# Patient Record
Sex: FEMALE | Race: BLACK OR AFRICAN AMERICAN | NOT HISPANIC OR LATINO | Employment: FULL TIME | URBAN - METROPOLITAN AREA
[De-identification: names, ages, dates, MRNs, and addresses within clinical notes are randomized per-mention and may not be internally consistent; named-entity substitution may affect disease eponyms.]

---

## 2023-03-16 LAB
THYROTROPIN (MIU/L) IN SER/PLAS BY DETECTION LIMIT <= 0.05 MIU/L: 0.03 MIU/L (ref 0.44–3.98)
THYROXINE (T4) FREE (NG/DL) IN SER/PLAS: 2.33 NG/DL (ref 0.78–1.48)

## 2023-03-21 LAB
CHLAMYDIA TRACH., AMPLIFIED: NEGATIVE
N. GONORRHEA, AMPLIFIED: NEGATIVE
TRICHOMONAS VAGINALIS: NEGATIVE

## 2023-05-02 LAB
APPEARANCE, URINE: CLEAR
BILIRUBIN, URINE: NEGATIVE
BLOOD, URINE: NEGATIVE
COLOR, URINE: YELLOW
GLUCOSE, URINE: NEGATIVE MG/DL
KETONES, URINE: NEGATIVE MG/DL
LEUKOCYTE ESTERASE, URINE: ABNORMAL
NITRITE, URINE: NEGATIVE
PH, URINE: 6 (ref 5–8)
PROTEIN, URINE: NEGATIVE MG/DL
RBC, URINE: 2 /HPF (ref 0–5)
SPECIFIC GRAVITY, URINE: 1.01 (ref 1–1.03)
SQUAMOUS EPITHELIAL CELLS, URINE: 2 /HPF
UROBILINOGEN, URINE: 2 MG/DL (ref 0–1.9)
WBC, URINE: 17 /HPF (ref 0–5)

## 2023-05-03 LAB — URINE CULTURE: ABNORMAL

## 2023-08-17 LAB
ALANINE AMINOTRANSFERASE (SGPT) (U/L) IN SER/PLAS: 12 U/L (ref 7–45)
ALBUMIN (G/DL) IN SER/PLAS: 4.2 G/DL (ref 3.4–5)
ALKALINE PHOSPHATASE (U/L) IN SER/PLAS: 88 U/L (ref 33–110)
ANION GAP IN SER/PLAS: 13 MMOL/L (ref 10–20)
APPEARANCE, URINE: NORMAL
ASPARTATE AMINOTRANSFERASE (SGOT) (U/L) IN SER/PLAS: 12 U/L (ref 9–39)
BASOPHILS (10*3/UL) IN BLOOD BY AUTOMATED COUNT: 0.08 X10E9/L (ref 0–0.1)
BASOPHILS/100 LEUKOCYTES IN BLOOD BY AUTOMATED COUNT: 1 % (ref 0–2)
BILIRUBIN TOTAL (MG/DL) IN SER/PLAS: 0.4 MG/DL (ref 0–1.2)
BILIRUBIN, URINE: NEGATIVE
BLOOD, URINE: NEGATIVE
CALCIUM (MG/DL) IN SER/PLAS: 9.6 MG/DL (ref 8.6–10.6)
CARBON DIOXIDE, TOTAL (MMOL/L) IN SER/PLAS: 27 MMOL/L (ref 21–32)
CHLORIDE (MMOL/L) IN SER/PLAS: 102 MMOL/L (ref 98–107)
CHOLESTEROL (MG/DL) IN SER/PLAS: 280 MG/DL (ref 0–199)
CHOLESTEROL IN HDL (MG/DL) IN SER/PLAS: 51.4 MG/DL
CHOLESTEROL/HDL RATIO: 5.4
COLOR, URINE: YELLOW
CREATININE (MG/DL) IN SER/PLAS: 0.97 MG/DL (ref 0.5–1.05)
EOSINOPHILS (10*3/UL) IN BLOOD BY AUTOMATED COUNT: 0.68 X10E9/L (ref 0–0.7)
EOSINOPHILS/100 LEUKOCYTES IN BLOOD BY AUTOMATED COUNT: 8.8 % (ref 0–6)
ERYTHROCYTE DISTRIBUTION WIDTH (RATIO) BY AUTOMATED COUNT: 16.1 % (ref 11.5–14.5)
ERYTHROCYTE MEAN CORPUSCULAR HEMOGLOBIN CONCENTRATION (G/DL) BY AUTOMATED: 31.1 G/DL (ref 32–36)
ERYTHROCYTE MEAN CORPUSCULAR VOLUME (FL) BY AUTOMATED COUNT: 81 FL (ref 80–100)
ERYTHROCYTES (10*6/UL) IN BLOOD BY AUTOMATED COUNT: 4.94 X10E12/L (ref 4–5.2)
GFR FEMALE: 72 ML/MIN/1.73M2
GLUCOSE (MG/DL) IN SER/PLAS: 132 MG/DL (ref 74–99)
GLUCOSE, URINE: NEGATIVE MG/DL
HEMATOCRIT (%) IN BLOOD BY AUTOMATED COUNT: 40.2 % (ref 36–46)
HEMOGLOBIN (G/DL) IN BLOOD: 12.5 G/DL (ref 12–16)
IMMATURE GRANULOCYTES/100 LEUKOCYTES IN BLOOD BY AUTOMATED COUNT: 0.4 % (ref 0–0.9)
KETONES, URINE: NEGATIVE MG/DL
LDL: 200 MG/DL (ref 0–99)
LEUKOCYTE ESTERASE, URINE: NEGATIVE
LEUKOCYTES (10*3/UL) IN BLOOD BY AUTOMATED COUNT: 7.7 X10E9/L (ref 4.4–11.3)
LYMPHOCYTES (10*3/UL) IN BLOOD BY AUTOMATED COUNT: 2.45 X10E9/L (ref 1.2–4.8)
LYMPHOCYTES/100 LEUKOCYTES IN BLOOD BY AUTOMATED COUNT: 31.7 % (ref 13–44)
MONOCYTES (10*3/UL) IN BLOOD BY AUTOMATED COUNT: 0.37 X10E9/L (ref 0.1–1)
MONOCYTES/100 LEUKOCYTES IN BLOOD BY AUTOMATED COUNT: 4.8 % (ref 2–10)
NEUTROPHILS (10*3/UL) IN BLOOD BY AUTOMATED COUNT: 4.13 X10E9/L (ref 1.2–7.7)
NEUTROPHILS/100 LEUKOCYTES IN BLOOD BY AUTOMATED COUNT: 53.3 % (ref 40–80)
NITRITE, URINE: NEGATIVE
NRBC (PER 100 WBCS) BY AUTOMATED COUNT: 0 /100 WBC (ref 0–0)
PH, URINE: 5 (ref 5–8)
PLATELETS (10*3/UL) IN BLOOD AUTOMATED COUNT: 444 X10E9/L (ref 150–450)
POTASSIUM (MMOL/L) IN SER/PLAS: 4.2 MMOL/L (ref 3.5–5.3)
PROTEIN TOTAL: 7.7 G/DL (ref 6.4–8.2)
PROTEIN, URINE: NEGATIVE MG/DL
SODIUM (MMOL/L) IN SER/PLAS: 138 MMOL/L (ref 136–145)
SPECIFIC GRAVITY, URINE: 1.02 (ref 1–1.03)
THYROTROPIN (MIU/L) IN SER/PLAS BY DETECTION LIMIT <= 0.05 MIU/L: 0.31 MIU/L (ref 0.44–3.98)
THYROXINE (T4) FREE (NG/DL) IN SER/PLAS: 1.71 NG/DL (ref 0.78–1.48)
TRIGLYCERIDE (MG/DL) IN SER/PLAS: 143 MG/DL (ref 0–149)
UREA NITROGEN (MG/DL) IN SER/PLAS: 15 MG/DL (ref 6–23)
UROBILINOGEN, URINE: <2 MG/DL (ref 0–1.9)
VLDL: 29 MG/DL (ref 0–40)

## 2023-09-30 PROBLEM — E66.9 OBESITY, CLASS II, BMI 35-39.9: Status: ACTIVE | Noted: 2023-09-30

## 2023-09-30 PROBLEM — F43.20 ADULT SITUATIONAL STRESS DISORDER: Status: ACTIVE | Noted: 2023-09-30

## 2023-09-30 PROBLEM — T81.89XA DELAYED SURGICAL WOUND HEALING: Status: ACTIVE | Noted: 2023-09-30

## 2023-09-30 PROBLEM — R19.8 FIRMNESS OF ABDOMEN: Status: ACTIVE | Noted: 2023-09-30

## 2023-09-30 PROBLEM — N97.9 FEMALE INFERTILITY: Status: ACTIVE | Noted: 2023-09-30

## 2023-09-30 PROBLEM — R92.8 ABNORMALITY OF RIGHT BREAST ON SCREENING MAMMOGRAM: Status: ACTIVE | Noted: 2023-09-30

## 2023-09-30 PROBLEM — N91.2 AMENORRHEA: Status: ACTIVE | Noted: 2023-09-30

## 2023-09-30 PROBLEM — J06.9 URI WITH COUGH AND CONGESTION: Status: ACTIVE | Noted: 2023-09-30

## 2023-09-30 PROBLEM — R35.0 URINARY FREQUENCY: Status: ACTIVE | Noted: 2023-09-30

## 2023-09-30 PROBLEM — I10 HYPERTENSION: Status: ACTIVE | Noted: 2023-09-30

## 2023-09-30 PROBLEM — M65.311 TRIGGER FINGER OF RIGHT THUMB: Status: ACTIVE | Noted: 2023-09-30

## 2023-09-30 PROBLEM — N83.202 CYST OF OVARY, LEFT: Status: ACTIVE | Noted: 2023-09-30

## 2023-09-30 PROBLEM — N92.6 ABNORMAL MENSTRUATION: Status: ACTIVE | Noted: 2023-09-30

## 2023-09-30 PROBLEM — Z86.018 HISTORY OF UTERINE FIBROID: Status: ACTIVE | Noted: 2023-09-30

## 2023-09-30 PROBLEM — K58.0 IRRITABLE BOWEL SYNDROME WITH DIARRHEA: Status: ACTIVE | Noted: 2023-09-30

## 2023-09-30 PROBLEM — N93.9 ABNORMAL UTERINE BLEEDING (AUB): Status: ACTIVE | Noted: 2023-09-30

## 2023-09-30 PROBLEM — E66.812 OBESITY, CLASS II, BMI 35-39.9: Status: ACTIVE | Noted: 2023-09-30

## 2023-09-30 PROBLEM — Z98.890 STATUS POST BREAST REDUCTION: Status: ACTIVE | Noted: 2023-09-30

## 2023-09-30 PROBLEM — E78.5 HYPERLIPIDEMIA: Status: ACTIVE | Noted: 2023-09-30

## 2023-09-30 PROBLEM — E05.00 GRAVES' OPHTHALMOPATHY: Status: ACTIVE | Noted: 2023-09-30

## 2023-09-30 PROBLEM — D50.9 IRON DEFICIENCY ANEMIA: Status: ACTIVE | Noted: 2023-09-30

## 2023-09-30 PROBLEM — G47.9 SLEEP DISTURBANCE: Status: ACTIVE | Noted: 2023-09-30

## 2023-09-30 PROBLEM — R09.81 SINUS CONGESTION: Status: ACTIVE | Noted: 2023-09-30

## 2023-09-30 PROBLEM — G25.81 RESTLESS LEGS: Status: ACTIVE | Noted: 2023-09-30

## 2023-09-30 PROBLEM — E55.9 VITAMIN D DEFICIENCY: Status: ACTIVE | Noted: 2023-09-30

## 2023-09-30 PROBLEM — R93.5 ABNORMAL ULTRASOUND OF UTERUS: Status: ACTIVE | Noted: 2023-09-30

## 2023-09-30 PROBLEM — R03.0 BLOOD PRESSURE ELEVATED WITHOUT HISTORY OF HTN: Status: ACTIVE | Noted: 2023-09-30

## 2023-09-30 PROBLEM — N92.6 IRREGULAR MENSES: Status: ACTIVE | Noted: 2023-09-30

## 2023-09-30 PROBLEM — R79.9 ABNORMAL BLOOD CHEMISTRY: Status: ACTIVE | Noted: 2023-09-30

## 2023-09-30 PROBLEM — K30 FUNCTIONAL DYSPEPSIA: Status: ACTIVE | Noted: 2023-09-30

## 2023-09-30 PROBLEM — N92.1 BREAKTHROUGH BLEEDING ON BIRTH CONTROL PILLS: Status: ACTIVE | Noted: 2023-09-30

## 2023-09-30 PROBLEM — E03.9 HYPOTHYROIDISM: Status: ACTIVE | Noted: 2023-09-30

## 2023-09-30 PROBLEM — Z90.49 S/P CHOLE: Status: ACTIVE | Noted: 2023-09-30

## 2023-09-30 PROBLEM — R10.9 ABDOMINAL PAIN: Status: ACTIVE | Noted: 2023-09-30

## 2023-09-30 PROBLEM — R39.9 URINARY SYMPTOM OR SIGN: Status: ACTIVE | Noted: 2023-09-30

## 2023-09-30 RX ORDER — BROMPHENIRAMINE MALEATE, PSEUDOEPHEDRINE HYDROCHLORIDE, AND DEXTROMETHORPHAN HYDROBROMIDE 2; 30; 10 MG/5ML; MG/5ML; MG/5ML
10 SYRUP ORAL
COMMUNITY
Start: 2022-07-03 | End: 2024-03-26 | Stop reason: ALTCHOICE

## 2023-09-30 RX ORDER — LEVOCETIRIZINE DIHYDROCHLORIDE 5 MG/1
5 TABLET, FILM COATED ORAL EVERY EVENING
COMMUNITY

## 2023-09-30 RX ORDER — LEVOTHYROXINE SODIUM 13 UG/1
150 CAPSULE ORAL DAILY
COMMUNITY
End: 2024-03-26 | Stop reason: ALTCHOICE

## 2023-09-30 RX ORDER — DESIPRAMINE HYDROCHLORIDE 10 MG/1
1 TABLET ORAL DAILY
COMMUNITY
Start: 2022-02-23 | End: 2024-03-26 | Stop reason: ALTCHOICE

## 2023-09-30 RX ORDER — LEVOTHYROXINE SODIUM 200 UG/1
200 TABLET ORAL DAILY
COMMUNITY
Start: 2020-03-31 | End: 2023-12-21

## 2023-09-30 RX ORDER — LEVOTHYROXINE SODIUM 175 UG/1
1 TABLET ORAL DAILY
COMMUNITY
Start: 2020-03-31 | End: 2024-04-15

## 2023-09-30 RX ORDER — IBUPROFEN 800 MG/1
800 TABLET ORAL EVERY 8 HOURS PRN
COMMUNITY
Start: 2023-03-20

## 2023-09-30 RX ORDER — ACETAMINOPHEN 500 MG
500 TABLET ORAL
COMMUNITY
Start: 2021-01-27 | End: 2024-01-08 | Stop reason: ALTCHOICE

## 2023-09-30 RX ORDER — HYDROCHLOROTHIAZIDE 25 MG/1
1 TABLET ORAL DAILY
COMMUNITY
Start: 2021-12-07 | End: 2023-10-20

## 2023-09-30 RX ORDER — DROSPIRENONE 4 MG/1
1 TABLET, FILM COATED ORAL DAILY
COMMUNITY
Start: 2022-01-07 | End: 2024-03-26 | Stop reason: ALTCHOICE

## 2023-09-30 RX ORDER — DESIPRAMINE HYDROCHLORIDE 25 MG/1
1 TABLET ORAL DAILY
COMMUNITY
Start: 2022-02-23 | End: 2024-03-26 | Stop reason: ALTCHOICE

## 2023-09-30 RX ORDER — FLUTICASONE PROPIONATE 50 MCG
2 SPRAY, SUSPENSION (ML) NASAL DAILY
COMMUNITY

## 2023-09-30 RX ORDER — HYDROXYZINE HYDROCHLORIDE 25 MG/1
1 TABLET, FILM COATED ORAL DAILY
COMMUNITY
Start: 2022-01-14 | End: 2024-06-04 | Stop reason: SDUPTHER

## 2023-09-30 RX ORDER — FERROUS SULFATE 325(65) MG
65 TABLET ORAL
COMMUNITY
Start: 2017-11-29

## 2023-09-30 RX ORDER — ERGOCALCIFEROL 1.25 MG/1
50000 CAPSULE ORAL
COMMUNITY
Start: 2022-03-11

## 2023-09-30 RX ORDER — LEVOTHYROXINE SODIUM 112 UG/1
2 TABLET ORAL DAILY
COMMUNITY
End: 2023-12-21

## 2023-10-02 ENCOUNTER — APPOINTMENT (OUTPATIENT)
Dept: ENDOCRINOLOGY | Facility: CLINIC | Age: 48
End: 2023-10-02
Payer: COMMERCIAL

## 2023-10-03 ENCOUNTER — APPOINTMENT (OUTPATIENT)
Dept: ENDOCRINOLOGY | Facility: CLINIC | Age: 48
End: 2023-10-03
Payer: COMMERCIAL

## 2023-10-05 ENCOUNTER — APPOINTMENT (OUTPATIENT)
Dept: SURGERY | Facility: CLINIC | Age: 48
End: 2023-10-05
Payer: COMMERCIAL

## 2023-10-19 ENCOUNTER — OFFICE VISIT (OUTPATIENT)
Dept: PRIMARY CARE | Facility: CLINIC | Age: 48
End: 2023-10-19
Payer: COMMERCIAL

## 2023-10-19 VITALS — WEIGHT: 174 LBS | SYSTOLIC BLOOD PRESSURE: 124 MMHG | BODY MASS INDEX: 35.14 KG/M2 | DIASTOLIC BLOOD PRESSURE: 84 MMHG

## 2023-10-19 DIAGNOSIS — E78.5 HYPERLIPIDEMIA, UNSPECIFIED HYPERLIPIDEMIA TYPE: Primary | ICD-10-CM

## 2023-10-19 DIAGNOSIS — E78.5 HYPERLIPIDEMIA, UNSPECIFIED HYPERLIPIDEMIA TYPE: ICD-10-CM

## 2023-10-19 PROCEDURE — 99214 OFFICE O/P EST MOD 30 MIN: CPT | Performed by: INTERNAL MEDICINE

## 2023-10-19 PROCEDURE — 3079F DIAST BP 80-89 MM HG: CPT | Performed by: INTERNAL MEDICINE

## 2023-10-19 PROCEDURE — 1036F TOBACCO NON-USER: CPT | Performed by: INTERNAL MEDICINE

## 2023-10-19 PROCEDURE — 3074F SYST BP LT 130 MM HG: CPT | Performed by: INTERNAL MEDICINE

## 2023-10-19 RX ORDER — GLUCOSAMINE/CHONDR SU A SOD 167-133 MG
500 CAPSULE ORAL
Qty: 30 TABLET | Refills: 11 | Status: SHIPPED | OUTPATIENT
Start: 2023-10-19 | End: 2023-11-13

## 2023-10-19 RX ORDER — GLUCOSAMINE/CHONDR SU A SOD 167-133 MG
500 CAPSULE ORAL
Qty: 30 TABLET | Refills: 11 | Status: SHIPPED | OUTPATIENT
Start: 2023-10-19 | End: 2023-10-19

## 2023-10-19 ASSESSMENT — PATIENT HEALTH QUESTIONNAIRE - PHQ9
2. FEELING DOWN, DEPRESSED OR HOPELESS: NOT AT ALL
1. LITTLE INTEREST OR PLEASURE IN DOING THINGS: NOT AT ALL
SUM OF ALL RESPONSES TO PHQ9 QUESTIONS 1 AND 2: 0

## 2023-10-19 NOTE — PROGRESS NOTES
Subjective   Patient ID: Anastacio Mcknight is a 48 y.o. female who presents for No chief complaint on file..    HPI  Patient in for a visit  Stress hard to find a venue for 2024   Review of Systems  General: Denies fever, chills, night sweats,  Eyes: Negative for recent visual changes  Ears, Nose, Throat :  Negative for hearing changes, sinus discomfort  Dermatologic: Negative for new skin conditions, rash  Respiratory: Negative for wheezing, shortness of breath, cough  Cardiovascular: Negative for chest pain, palpitations, or leg swelling  Gastrointestinal: Negative for nausea/vomiting, abdominal pain, changes in bowel habits  Genitourinary NEGATIVE FOR URINARY INCONTINENCE urgency , frequency, discomfort   Musculoskeletal: see hpi  Neurological: Negative for headaches, dizziness    Previous history  Past Medical History:   Diagnosis Date    Adjustment disorder, unspecified 2022    Adult situational stress disorder    Essential (primary) hypertension 2022    Hypertension    Hypertrophy of breast 2019    Large breasts    Hypothyroidism, unspecified 2021    Hypothyroidism    Irregular menstruation, unspecified 2014    Missed period    Other conditions influencing health status 2014    History of pregnancy    Personal history of diseases of the blood and blood-forming organs and certain disorders involving the immune mechanism     History of anemia    Personal history of other infectious and parasitic diseases 2019    History of surgical site infection    Personal history of other specified conditions 2016    History of fatigue    Personal history of other specified conditions 2021    History of dizziness    Sleep disorder, unspecified 2022    Sleep disturbance     Past Surgical History:   Procedure Laterality Date     SECTION, CLASSIC  2017     Section    GALLBLADDER SURGERY  2017    Gallbladder Surgery    OTHER SURGICAL  HISTORY  02/09/2021    Laparoscopy    OTHER SURGICAL HISTORY  02/14/2019    Breast reduction    OTHER SURGICAL HISTORY  08/26/2019    Dilation and curettage    OTHER SURGICAL HISTORY  08/26/2019    [place] tooth extraction    TONSILLECTOMY  11/29/2017    Tonsillectomy     Social History     Tobacco Use    Smoking status: Never    Smokeless tobacco: Never   Substance Use Topics    Alcohol use: Yes     Comment: 3 drinks/month     Family History   Problem Relation Name Age of Onset    Diabetes Father      Cancer Father      Other (cardiac disorder) Brother      Brain cancer Mother's Brother      Breast cancer Maternal Grandmother      Diabetes Maternal Grandfather      Heart failure Other grandmother     Cancer Other grandfather     Cancer Other uncle     Colon cancer Maternal Great-Grandfather       No Known Allergies  Current Outpatient Medications   Medication Instructions    acetaminophen (TYLENOL) 500 mg, oral, EVERY 4 TO 6 HOURS AS NEEDED.<BR>    brompheniramine-pseudoeph-DM 2-30-10 mg/5 mL syrup 10 mL, oral, EVERY 4 TO 6 HOURS AS NEEDED.<BR>    desipramine (Norpramin) 10 mg tablet 1 tablet, oral, Daily    desipramine (Norpramin) 25 mg tablet 1 tablet, oral, Daily    diphenhydramine HCl (BENADRYL ORAL) 1 tablet, oral, Daily PRN    drospirenone, contraceptive, (Slynd) 4 mg (28) tablet 1 tablet, oral, Daily    ergocalciferol (VITAMIN D-2) 50,000 Units, oral, Weekly    ferrous sulfate 325 (65 Fe) MG tablet 65 mg of iron, oral, 3 times daily with meals    fluticasone (Flonase) 50 mcg/actuation nasal spray 2 sprays, Each Nostril, Daily, Shake gently. Before first use, prime pump. After use, clean tip and replace cap.    hydroCHLOROthiazide (HYDRODiuril) 25 mg tablet 1 tablet, oral, Daily    hydrOXYzine HCL (Atarax) 25 mg tablet 1 tablet, oral, Daily    ibuprofen 800 mg, oral, Every 8 hours PRN    levocetirizine (XYZAL) 5 mg, oral, Every evening    levothyroxine (Synthroid, Levoxyl) 112 mcg tablet 2 tablets, oral, Daily     levothyroxine (Synthroid, Levoxyl) 175 mcg tablet 1 tablet, oral, Daily    levothyroxine (SYNTHROID, LEVOXYL) 200 mcg, oral, Daily, monday thru saturday and half pill on sunday    levothyroxine (TIROSINT) 150 mcg, oral, Daily       Objective       Physical Exam    Vital Signs: as recorded above  General: Well groomed, well nourished   Orientation:  Alert , oriented to time, place , and person   Mood and Affect:  Cooperative , no apparent distress normal affect  Skin: Good color, good turgor  Eyes: Extra ocular muscle movements intact, anicteric sclerae  Neck: Supple, full range of movement  Chest: Normal breath sounds, normal chest wall exam, symmetric, good air entry, clear to auscultation  Heart: Regular rate and rhythm, without murmur, gallop, or rubs  BACK:  no CTLS spine tenderness, no flank tenderness  Extremities: full range of movement  bilateral UE and bilateral LE,  no lower extremity edema  Neurological: Alert, oriented, cranial nerves II-XII intact except for visual acuity  Sensation:  Intact   Gait: normal steady    Assessment/Plan   Anastacio Mcknight is a 48 y.o. female who presents for the concerns below:    Problem List Items Addressed This Visit    None  Stress      Htn restart hydrochlorothiazide but at 1/2 tab    Anemia with episodic   Works in main campus [employer]    Discussed with:   Return in :    Portions of this note were generated using digital voice recognition software, and may contain grammatical errors       Jeremy Cabral MD  10/19/23  12:14 PM

## 2023-10-20 DIAGNOSIS — I10 HYPERTENSION, UNSPECIFIED TYPE: ICD-10-CM

## 2023-10-20 RX ORDER — HYDROCHLOROTHIAZIDE 25 MG/1
25 TABLET ORAL DAILY
Qty: 90 TABLET | Refills: 1 | Status: SHIPPED | OUTPATIENT
Start: 2023-10-20 | End: 2024-06-05 | Stop reason: ALTCHOICE

## 2023-10-30 ENCOUNTER — OFFICE VISIT (OUTPATIENT)
Dept: DERMATOLOGY | Facility: CLINIC | Age: 48
End: 2023-10-30
Payer: COMMERCIAL

## 2023-10-30 DIAGNOSIS — L21.9 SEBORRHEIC DERMATITIS: ICD-10-CM

## 2023-10-30 PROCEDURE — 1036F TOBACCO NON-USER: CPT

## 2023-10-30 PROCEDURE — 99202 OFFICE O/P NEW SF 15 MIN: CPT

## 2023-10-30 NOTE — PROGRESS NOTES
Subjective   HPI: Anastacio Mcknight is a 48 y.o. female is a new patient here for evaluation and treatment of a dry rough face. Has been going on for months. Uses baby soap and dove as face wash. Does not wear makeup. No amount of lotion applied makes her skin not dry. Hx atopic dermatitis. Notices some flaking in/in-between eyebrows.    ROS: No other skin or systemic complaints other than what is documented elsewhere in the note.    ALLERGIES: Patient has no known allergies.    SOCIAL:  reports that she has never smoked. She has never used smokeless tobacco. She reports current alcohol use. No history on file for drug use.    Objective   Head - Anterior (Face)  Symmetric erythematous patches overlying greasy scales.        Assessment/Plan   1. Seborrheic dermatitis  Head - Anterior (Face)    Start:  Start SKNV cream BID    Discussed benign nature of seb derm. Patient verbalizes agreement to treatment.     Related Medications  ketoconazole-iodoquinoL-hc 2-1-2.5 % cream  Apply a thin layer to the affect area in the morning and at night         FOLLOW UP: 4 weeks, PRN    The patient was encouraged to contact me with any further questions or concerns.  Azalia Blanco PA-C  10/30/2023

## 2023-11-01 ENCOUNTER — NUTRITION (OUTPATIENT)
Dept: NUTRITION | Facility: CLINIC | Age: 48
End: 2023-11-01
Payer: COMMERCIAL

## 2023-11-01 VITALS — WEIGHT: 175.93 LBS | BODY MASS INDEX: 35.47 KG/M2 | HEIGHT: 59 IN

## 2023-11-01 PROCEDURE — 97802 MEDICAL NUTRITION INDIV IN: CPT | Performed by: DIETITIAN, REGISTERED

## 2023-11-01 NOTE — PROGRESS NOTES
Assessment:  Pt is a 48 y.o. yr old Female with a past medical history of HLD, HTN, type 2 diabetes, hypothyroidism, IBS-D, referred for initial nutrition assessment.  Pt wants to improve her health through lifestyle change.  Diet history reflects low intake of fiber, excessive portions of starch and added sugar.  Discussed general healthy eating guidelines and the my plate model to promote wellness, weight mgmt, and disease prevention.  Reviewed sources of dietary fiber and explained benefits in bowel function, glycemia, CHL, and satiety/weight mgmt.  Explored nutrition facts labels of frequently consumed foods.   Pt receptive, asks appropriate questions, demonstrates understanding.  Suggest re-check Hgb A1c. (6.5% at last assessment in 2019, blood glucose at 132 on recent CMP).           Lab Results   Component Value Date    HGBA1C 6.5 06/12/2019    CHOL 280 (H) 08/17/2023    LDLF 200 (H) 08/17/2023    TRIG 143 08/17/2023    HDL 51.4 08/17/2023        Typical Intake: 2 meals/day  Breakfast: [employer] pancakes, sausage, OJ   Lunch:  potato soup from [employer] or smoothie from [employer]  Snack: yoplait yogurt, candy or cookie on occasion   Dinner: roast or chili with vegetables or lasagna or calzone  Beverages: water, flavored water, pepsi, cocktails every other week (in moderation)    Meal preparation: pt cooks, daughter helps  Dining out/take out/fast food: 2x/week    Allergies: None  Intolerance: None  Appetite: Fluctuates  Intake: >75%  GI Symptoms : diarrhea and abdominal pain Frequency: daily.  Gallbladder removed in 2017.  Swallowing Difficulty: No problems with swallowing  Dentition : own    Vitamins/minerals/supplements: folic, women's multivitamin     Exercise: walks a lot    Diagnosis:   Diagnosis Statement 1:  Diagnosis Status: New  Diagnosis : Altered nutrition related lab values  related to  endocrine dysfunction  as evidenced by Hgb A1c 6.5% in 2019, blood glucose on 132 in 8/2023.    Diagnosis Statement  2:  Diagnosis Status: New  Diagnosis : Obese related to  excessive energy intake and physical inactivity  as evidenced by BMI of 35.5.      Intervention:  MNT for weight mgmt, HLD, and DM2    General healthy eating guidelines- my plate model  Heart healthy nutrition therapy      Educational Materials provided: My Plate Planner, NCM Fiber Content List, AVS      Monitoring and Evaluation:  Will monitor weight trend, intake, labs, and pt progress.    Melody Alcaraz MS, RDN, LD

## 2023-11-12 DIAGNOSIS — E78.5 HYPERLIPIDEMIA, UNSPECIFIED HYPERLIPIDEMIA TYPE: ICD-10-CM

## 2023-11-13 RX ORDER — GLUCOSAMINE/CHONDR SU A SOD 167-133 MG
500 CAPSULE ORAL
Qty: 90 TABLET | Refills: 3 | Status: SHIPPED | OUTPATIENT
Start: 2023-11-13 | End: 2024-06-05 | Stop reason: SINTOL

## 2023-11-15 ENCOUNTER — CONSULT (OUTPATIENT)
Dept: ENDOCRINOLOGY | Facility: CLINIC | Age: 48
End: 2023-11-15
Payer: COMMERCIAL

## 2023-11-15 VITALS
HEART RATE: 90 BPM | DIASTOLIC BLOOD PRESSURE: 70 MMHG | RESPIRATION RATE: 18 BRPM | WEIGHT: 175 LBS | HEIGHT: 59 IN | TEMPERATURE: 97.6 F | SYSTOLIC BLOOD PRESSURE: 100 MMHG | BODY MASS INDEX: 35.28 KG/M2

## 2023-11-15 DIAGNOSIS — Z11.3 SCREENING FOR STDS (SEXUALLY TRANSMITTED DISEASES): ICD-10-CM

## 2023-11-15 DIAGNOSIS — N91.4 SECONDARY OLIGOMENORRHEA: ICD-10-CM

## 2023-11-15 DIAGNOSIS — Z31.41 FERTILITY TESTING: ICD-10-CM

## 2023-11-15 DIAGNOSIS — Z11.59 ENCOUNTER FOR SCREENING FOR OTHER VIRAL DISEASES: ICD-10-CM

## 2023-11-15 DIAGNOSIS — Z31.69 PROCREATIVE MANAGEMENT COUNSELING: ICD-10-CM

## 2023-11-15 DIAGNOSIS — Z13.1 SCREENING FOR DIABETES MELLITUS: ICD-10-CM

## 2023-11-15 DIAGNOSIS — N93.9 ABNORMAL UTERINE BLEEDING (AUB): Primary | ICD-10-CM

## 2023-11-15 DIAGNOSIS — Z01.83 ENCOUNTER FOR RH BLOOD TYPING: ICD-10-CM

## 2023-11-15 DIAGNOSIS — Z31.69 ENCOUNTER FOR PRECONCEPTION CONSULTATION: ICD-10-CM

## 2023-11-15 PROCEDURE — 99215 OFFICE O/P EST HI 40 MIN: CPT | Performed by: OBSTETRICS & GYNECOLOGY

## 2023-11-15 RX ORDER — NORETHINDRONE 5 MG/1
5 TABLET ORAL DAILY
Qty: 30 TABLET | Refills: 11 | Status: SHIPPED | OUTPATIENT
Start: 2023-11-15 | End: 2024-06-05 | Stop reason: SDUPTHER

## 2023-11-15 ASSESSMENT — LIFESTYLE VARIABLES: TOBACCO_USE: NO

## 2023-11-15 ASSESSMENT — PAIN SCALES - GENERAL: PAINLEVEL: 0-NO PAIN

## 2023-11-15 NOTE — PATIENT INSTRUCTIONS
48 y.o.  female with  Secondary  infertility, suspected oligoovulation (possible perimenopause) and the following pertinent medical issues: Age (48), AUB with possible adnenomyosis, HTN, hypothryoidism .  Partner SA: No Assessment    COUNSELING  We discussed causes of infertility including hormonal, egg quality issues, structural problems such as endometriosis, adhesions, or tubal problems, uterine factors such as polyps or fibroids, and sperm issues. Reviewed evaluation of such as well. We discussed various methods for achieving pregnancy in some detail including, ovulation induction, insemination, superovulation and IVF.    We discussed the impact of age on fertility. We discussed that a woman is born with all of the follicles that she will have in her lifetime and that these numbers progressively decrease as the patient reaches menopause. We discussed that women can remain fertile into their late 30’s and even early 40’s, however, chance for success is significantly lower for women who have infertility. We also discussed the higher rates of aneuploidy and miscarriage that occur as women age.    Discussed that at patient's age, the only fertility treatment likely to be effective is donor egg IVF.  We briefly discussed donor egg options.  We reviewed that if patient wants to proceed with egg donor would need-  MFM discussion to review risks  Financial consult to discuss costs  Further evaluation and management of AUB and assessment of uterine cavity    Routine Testing  Fertility Center  STDs Within 1 year   Genetic carrier Waiver/Completed   T&S Within 1 year   AMH Within 1 year   TSH Within 1 year   Rubella/Varicella Within 5 years     BMI Testing  Fertility Center  CBC Within 1 year   CMP Within 1 year   HgbA1c Within 1 year   Mag, Phos, Vit D <18 Within 1 year   MFM > 40  REQ   Wt loss consult > 40 OPT     PLAN  Orders Placed This Encounter   Procedures    Endometrial biopsy    US pelvis transvaginal     Antimullerian Hormone (Amh)    TSH with reflex to Free T4 if abnormal    Prolactin    Hemoglobin A1C    Testosterone,Free and Total    Follicle Stimulating Hormone    Luteinizing Hormone    Estradiol    Progesterone    Type And Screen    Rubella Antibody, Igg    Varicella Zoster Antibody, Igg    Hepatitis B surface antigen    Hepatitis C Antibody    HIV-1 and HIV-2 antibodies    Syphilis Screen with Reflex    C. Trachomatis / N. Gonorrhoeae, Amplified Detection       GENETIC SCREENING PATIENT  NA    PARTNER  Yes Semen Analysis: Not ordered today  Yes Genetic screening: Not ordered today    FOLLOW UP   Consults: MFM consult: indication:Age and HTN  Chart to primary nurse for care coordination and patient check list/education  Enroll in Engaged MD  Take prenatal vitamins, vitamin D 2000 IUs daily  Discussed that PAP and mammogram must be updated if appropriate based on age and clinical history and results received before treatment can begin  Discussed that treatment cannot proceed until checklist items are complete   6 week follow up with MD to review further management options (IVF consult).  Also schedule consult with Herminia Garcia to discuss egg donor options  Additional testing for BMI < 18 or > 40: NA    -Will Rx Aygestin now for management of abnormal uterine bleeding; also schedule TVUS and endometrial biopsy as above    If patient decides to pursue egg donor will also need:  >46 yo testing  Diagnostic hysteroscopy  Partner STDs, Sperm freeze, and genetic screening

## 2023-11-15 NOTE — PROGRESS NOTES
In Person    NEW FERTILITY PATIENT VISIT    Referred by:   Dr. Leon  Accompanied today by: Who is accompanying you to your visit:: Spouse      Anastacio Mcknight is a 48 y.o.  female who presents with Please tell us your reason for this visit today: Infertility  -Also has AUB and hypothyroidism  -Currently on synthroid 175 mcg  -AUB- has been evaluated by Dr. Leon and Rx given for Slynd, not currently taking    Relationship Status:     x 18 years     Length of conception attempts:  2 years    PRIOR EVALUATION / TREATMENT  none  Prior Labs   Latest Reference Range & Units 23 09:42   GLUCOSE 74 - 99 mg/dL 132 (H)   SODIUM 136 - 145 mmol/L 138   POTASSIUM 3.5 - 5.3 mmol/L 4.2   CHLORIDE 98 - 107 mmol/L 102   Bicarbonate 21 - 32 mmol/L 27   Anion Gap 10 - 20 mmol/L 13   Blood Urea Nitrogen 6 - 23 mg/dL 15   Creatinine 0.50 - 1.05 mg/dL 0.97   Calcium 8.6 - 10.6 mg/dL 9.6   Albumin 3.4 - 5.0 g/dL 4.2   Alkaline Phosphatase 33 - 110 U/L 88   ALT 7 - 45 U/L 12   AST 9 - 39 U/L 12   Bilirubin Total 0.0 - 1.2 mg/dL 0.4   HDL CHOLESTEROL mg/dL 51.4   Cholesterol/HDL Ratio  5.4 !   LDL Calculated 0 - 99 mg/dL 200 (H)   VLDL 0 - 40 mg/dL 29   TRIGLYCERIDES 0 - 149 mg/dL 143   Total Protein 6.4 - 8.2 g/dL 7.7   CHOLESTEROL 0 - 199 mg/dL 280 (H)   Thyroxine, Free 0.78 - 1.48 ng/dL 1.71 (H)   Thyroid Stimulating Hormone 0.44 - 3.98 mIU/L 0.31 (L)   (H): Data is abnormally high  !: Data is abnormal  (L): Data is abnormally low      Latest Reference Range & Units 23 09:42   WBC 4.4 - 11.3 x10E9/L 7.7   nRBC 0.0 - 0.0 /100 WBC 0.0   RBC 4.00 - 5.20 x10E12/L 4.94   HEMOGLOBIN 12.0 - 16.0 g/dL 12.5   HEMATOCRIT 36.0 - 46.0 % 40.2   MCV 80 - 100 fL 81   MCHC 32.0 - 36.0 g/dL 31.1 (L)   RED CELL DISTRIBUTION WIDTH 11.5 - 14.5 % 16.1 (H)   Platelets 150 - 450 x10E9/L 444   Neutrophils % 40.0 - 80.0 % 53.3   Immature Granulocytes %, Automated 0.0 - 0.9 % 0.4   Lymphocytes % 13.0 - 44.0 % 31.7   Monocytes % 2.0  - 10.0 % 4.8   Eosinophils % 0.0 - 6.0 % 8.8   Basophils % 0.0 - 2.0 % 1.0   Neutrophils Absolute 1.20 - 7.70 x10E9/L 4.13   Lymphocytes Absolute 1.20 - 4.80 x10E9/L 2.45   Monocytes Absolute 0.10 - 1.00 x10E9/L 0.37   Eosinophils Absolute 0.00 - 0.70 x10E9/L 0.68   Basophils Absolute 0.00 - 0.10 x10E9/L 0.08   (L): Data is abnormally low  (H): Data is abnormally high    TVUS 202  FINDINGS:  UTERUS:  Uterus measures 11.2 cm in length, 7 cm in transverse dimension and  4.1 cm in AP dimension. A 1.8 cm intramural fundal leiomyoma.  Heterogeneous increased echogenicity of inner myometrium/junctional  zone adjoining the endometrial lining concerning for adenomyosis.     ENDOMETRIUM:  Endometrium is of normal thickness measuring 4 mm.     RIGHT ADNEXA:  Right ovary could not be visualized.     LEFT ADNEXA:  A complex cystic solid left adnexal mass lesion is again identified  measuring 5.6 x 3.7 x 5.5 cm. The solid component is echogenic.     CUL DE SAC:  No free fluid.     IMPRESSION:  1.  Minimally enlarged uterus with normal endometrial thickening. A  small intramural fundal leiomyoma and findings in the junctional zone  concerning for adenomyosis.  2. Stable mixed cystic and solid left adnexal mass lesion  corresponding to the mass seen on previous MRI study. Correlating  with MR findings this is compatible with a cystic teratoma.  3. Right ovary could not be visualized.    OB Hx     OB History          9    Para   3    Term   2       1    AB   3    Living   3         SAB   3    IAB        Ectopic        Multiple        Live Births   3               OB Hx-   ETOP x3 as a teenager  SAB x1 as a teenager   x4 (, , , )  - pregnancy was complicated by pre-eclampsa but still delivered at term   - C/Sx1 - for placenta abruption, complicated by PPH and blood transfusion  52015- SAB @ 9 weeks, passes spontaneously and had D&C for retained products  -Last 3 pregnancies were  with current partner    MENSTRUAL HISTORY  LMP:    -Currently bleeding- has been having constant bleeding x 1 month  Menarche:   12  Contraception: What (if any) type of birth control do you currently use?: Slyn  -Has been given Rx for Slynd for abnormal bleeding but only takes it intermittently   Cycle length:    Has been irregular since 2019  -Used to be regular x 4 days, then became irregular (no menses x 1 year) and then had period of continuous bleeding  Describe your bleeding: Describe your bleeding:: Average  Dysmenorrhea: Are your menstrual periods painful?: No    Has had endometrial biopsy -2020  FINAL DIAGNOSIS   A.  ENDOMETRIUM, BIOPSY:    -- WEAKLY PROLIFERATIVE ENDOMETRIUM WITH TUBAL METAPLASIA     Also had EMC done at time of laparoscopy for dermoid cyst-  FINAL DIAGNOSIS   A.  LEFT OVARIAN CYST:    --FRAGMENTS OF MATURE CYSTIC TERATOMA.     B.  ENDOMETRIAL CURETTINGS:   --SUPERFICIAL STRIPS AND FRAGMENTS OF ENDOCERVIX WITH SQUAMOUS METAPLASIA AND   OF LOWER UTERINE SEGMENT ENDOMETRIUM WITH NO SIGNIFICANT PATHOLOGICAL FINDINGS.       ENDOCRINE/INFERTILITY HISTORY  Duration of infertility:    Coital Activity/week:    Nipple Discharge: Do you experience any loss of milk or liquid discharge from the breasts?: No  Vision changes: Are you experiencing any vision changes?: No  Headaches: Are you experiencing headaches?: No  Excess hair growth: Are you experiencing persistent or worsening hair growth on the face, breasts or lower abdomen?: No  Excessive hair loss: Are you experiencing loss of hair from your scalp?: No  Acne: Are you experiencing acne?: No  Oily skin: Oily skin?: No    Recent weight change  Weight gain: Are you experiencing any increase in weight?: No  Weight loss:   Has lost ~ 20 lbs with increased exercise  Exercise more than 3 times a week: Exercise more than 3 times a week?: Unsure      GYN HISTORY   Have you ever been diagnosed with a sexually transmitted disease? Have you ever been  diagnosed with a sexually transmitted disease?: No  Please select all that are applicable:    Have you ever had Pelvic Inflammatory Disease? Have you ever had Pelvic Inflammatory Disease?: No  Have you had an abnormal PAP smear? Have you had an abnormal PAP smear?: No  Date & Result of last PAP smear: 2019 WNL  Have you ever had an abnormal Mammogram? Have you ever had an abnormal mammogram?: Yes  -Due to have additional testing  Date & result of your last mammogram: Date of last mammogram:: 9/2023  Do you have pelvic pain? Do you have pelvic pain?: No  How many times per week do you have intercourse?    Do you have pain with intercourse? Do you have pain with intercourse?: No  Do you use lubricants with intercourse?    Do you have pain with bowel movements? Do you have pain with bowel movements?: No             Do you have pain with a full bladder? Do you have pain with a full bladder?: No      PMH  Past Medical History:   Diagnosis Date    Adjustment disorder, unspecified 11/22/2022    Adult situational stress disorder    Essential (primary) hypertension 11/22/2022    Hypertension    Hypertrophy of breast 01/17/2019    Large breasts    Hypothyroidism, unspecified 12/29/2021    Hypothyroidism    Irregular menstruation, unspecified 06/20/2014    Missed period    Other conditions influencing health status 06/20/2014    History of pregnancy    Personal history of diseases of the blood and blood-forming organs and certain disorders involving the immune mechanism     History of anemia    Personal history of other infectious and parasitic diseases 02/04/2019    History of surgical site infection    Personal history of other specified conditions 05/13/2016    History of fatigue    Personal history of other specified conditions 12/07/2021    History of dizziness    Sleep disorder, unspecified 01/14/2022    Sleep disturbance     -HTN- hydrochlorothiazide  -Hypothyroidism, on 175 mcg synthroid managed by Dr. Alexander        MEDICATIONS  Current Outpatient Medications on File Prior to Visit   Medication Sig Dispense Refill    acetaminophen (Tylenol) 500 mg tablet Take 1 tablet (500 mg) by mouth. EVERY 4 TO 6 HOURS AS NEEDED.      desipramine (Norpramin) 25 mg tablet Take 1 tablet (25 mg) by mouth once daily.      diphenhydramine HCl (BENADRYL ORAL) Take 1 tablet by mouth once daily as needed.      drospirenone, contraceptive, (Slynd) 4 mg (28) tablet Take 1 tablet by mouth once daily.      ergocalciferol (Vitamin D-2) 1.25 MG (64359 UT) capsule Take 1 capsule (50,000 Units) by mouth 1 (one) time per week.      ferrous sulfate 325 (65 Fe) MG tablet Take 1 tablet (65 mg of iron) by mouth 3 times a day with meals.      hydroCHLOROthiazide (HYDRODiuril) 25 mg tablet TAKE 1 TABLET BY MOUTH DAILY 90 tablet 1    hydrOXYzine HCL (Atarax) 25 mg tablet Take 1 tablet (25 mg) by mouth once daily.      ibuprofen 800 mg tablet Take 1 tablet (800 mg) by mouth every 8 hours if needed (pain).      ketoconazole-iodoquinoL-hc 2-1-2.5 % cream Apply a thin layer to the affect area in the morning and at night 30 g 1    levocetirizine (Xyzal) 5 mg tablet Take 1 tablet (5 mg) by mouth once daily in the evening.      levothyroxine (Synthroid, Levoxyl) 175 mcg tablet Take 1 tablet (175 mcg) by mouth once daily.      niacin 500 mg tablet TAKE 1 TABLET (500 MG) BY MOUTH ONCE DAILY WITH A MEAL. 90 tablet 3    brompheniramine-pseudoeph-DM 2-30-10 mg/5 mL syrup Take 10 mL by mouth. EVERY 4 TO 6 HOURS AS NEEDED.      desipramine (Norpramin) 10 mg tablet Take 1 tablet (10 mg) by mouth once daily.      fluticasone (Flonase) 50 mcg/actuation nasal spray Administer 2 sprays into each nostril once daily. Shake gently. Before first use, prime pump. After use, clean tip and replace cap.      levothyroxine (Synthroid, Levoxyl) 112 mcg tablet Take 2 tablets (224 mcg) by mouth once daily.      levothyroxine (Synthroid, Levoxyl) 200 mcg tablet Take 1 tablet (200 mcg) by mouth  once daily. monday thru saturday and half pill on       levothyroxine (Tirosint) 150 mcg capsule Take 1 capsule (150 mcg) by mouth once daily.      [DISCONTINUED] niacin 500 mg tablet TAKE 1 TABLET (500 MG) BY MOUTH ONCE DAILY WITH A MEAL. 30 tablet 11     No current facility-administered medications on file prior to visit.        PSH  Past Surgical History:   Procedure Laterality Date     SECTION, CLASSIC  2017     Section    GALLBLADDER SURGERY  2017    Gallbladder Surgery    OTHER SURGICAL HISTORY  2021    Laparoscopy    OTHER SURGICAL HISTORY  2019    Breast reduction    OTHER SURGICAL HISTORY  2019    Dilation and curettage    OTHER SURGICAL HISTORY  2019    [place] tooth extraction    TONSILLECTOMY  2017    Tonsillectomy      -Laparoscopic ovarian cystectomy with Dr. Dasilva  -Dr. 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 Have you ever been incarcerated?: No  Do you have a history of domestic violence? Do you have a history of domestic violence?: No  Do you feel safe at home? Do you feel safe at home?: Yes  Do you have a history of any negative sexual experience such as incest or rape? Do you have a history of any negative sexual experience such as incest or rape?: Yes       PARTNER HISTORY  Partner Name:  Calvin Ernst  Age 49  : Partner :: 72  Occupation: Partner occupation::   Prior fertility history:   2 children together  PMH:  None  PSH:    None  Smoking:Partner: Recent or current tobacco use: No  Alcohol Use: Partner: Recent or current alcohol use: Yes  Occasional  Drug Use: Partner: Recent or current drug use: No  Medications:    Injuries: Partner: Any history of surgeries or injuries in the reproductive area?: No  STD: Have you ever been diagnosed with a sexually transmitted disease?: No    Please select all that are applicable:    SA: Prior Semen Analysis completed?: No  SA Results:      FAMILY HISTORY  Family History   Problem Relation Name Age of Onset    Diabetes Father      Cancer Father      Other (cardiac disorder) Brother      Brain cancer Mother's Brother      Breast cancer Maternal Grandmother      Diabetes Maternal Grandfather      Heart failure Other grandmother     Cancer Other grandfather     Cancer Other uncle     Colon cancer Maternal Great-Grandfather         CANCER HISTORY    Breast: Breast Cancer: Please answer Yes, No or Unsure. If Yes, please, identify which family member.: Grandma  Ovarian: Ovarian Cancer: Please answer Yes, No or Unsure. If Yes, please, identify which family member.: Cousin  Colon: Colon Cancer: Please answer Yes, No or Unsure. If Yes, please, identify which family member.: Grandma  Endometrial: Endometrial Cancer: Please answer Yes, No or Unsure. If Yes, please, identify which family member.: N/a      FAMILY VTE HISTORY  Family History of Blood Clots: Blood Clots: Please answer Yes, No or Unsure. If Yes, please, identify which family member.: Not sure    GENETIC HISTORY  Ethnic Background  Patient:   //  Partner:     Genetic Disease in Family  Patient: Patient: Defects and genetic syndromes? Please answer Yes, No or Unsure: No  Partner: Partner: Defects and genetic syndromes? Please answer Yes, No or Unsure: No  Birth Defects in Family  Patient: Patient: Birth defects? Please answer Yes, No or Unsure: No  Partner: Partner: Birth defects? Please answer  "Yes, No or Unsure: No  Genetic screening performed previously:   None     BMI:   BMI Readings from Last 1 Encounters:   11/15/23 35.35 kg/m²     VITALS:  /70   Pulse 90   Temp 36.4 °C (97.6 °F)   Resp 18   Ht 1.499 m (4' 11\")   Wt 79.4 kg (175 lb)   LMP  (LMP Unknown)   BMI 35.35 kg/m²     ASSESSMENT   48 y.o.  female with  Secondary  infertility, suspected oligoovulation (possible perimenopause) and the following pertinent medical issues: Age (48), AUB with possible adnenomyosis, HTN, hypothryoidism .  Partner SA: No Assessment    COUNSELING  We discussed causes of infertility including hormonal, egg quality issues, structural problems such as endometriosis, adhesions, or tubal problems, uterine factors such as polyps or fibroids, and sperm issues. Reviewed evaluation of such as well. We discussed various methods for achieving pregnancy in some detail including, ovulation induction, insemination, superovulation and IVF.    We discussed the impact of age on fertility. We discussed that a woman is born with all of the follicles that she will have in her lifetime and that these numbers progressively decrease as the patient reaches menopause. We discussed that women can remain fertile into their late 30’s and even early 40’s, however, chance for success is significantly lower for women who have infertility. We also discussed the higher rates of aneuploidy and miscarriage that occur as women age.    Discussed that at patient's age, the only fertility treatment likely to be effective is donor egg IVF.  We briefly discussed donor egg options.  We reviewed that if patient wants to proceed with egg donor would need-  MFM discussion to review risks  Financial consult to discuss costs  Further evaluation and management of AUB and assessment of uterine cavity    Routine Testing  Fertility Center  STDs Within 1 year   Genetic carrier Waiver/Completed   T&S Within 1 year   AMH Within 1 year   TSH Within 1 " year   Rubella/Varicella Within 5 years     BMI Testing  Fertility Center  CBC Within 1 year   CMP Within 1 year   HgbA1c Within 1 year   Mag, Phos, Vit D <18 Within 1 year   MFM > 40  REQ   Wt loss consult > 40 OPT     PLAN  Orders Placed This Encounter   Procedures    Endometrial biopsy    US pelvis transvaginal    Antimullerian Hormone (Amh)    TSH with reflex to Free T4 if abnormal    Prolactin    Hemoglobin A1C    Testosterone,Free and Total    Follicle Stimulating Hormone    Luteinizing Hormone    Estradiol    Progesterone    Type And Screen    Rubella Antibody, Igg    Varicella Zoster Antibody, Igg    Hepatitis B surface antigen    Hepatitis C Antibody    HIV-1 and HIV-2 antibodies    Syphilis Screen with Reflex    C. Trachomatis / N. Gonorrhoeae, Amplified Detection    Referral to Maternal Fetal Medicine       GENETIC SCREENING PATIENT  NA    PARTNER  Yes Semen Analysis: Not ordered today  Yes Genetic screening: Not ordered today    FOLLOW UP   Consults: MFM consult: indication:Age and HTN  Chart to primary nurse for care coordination and patient check list/education  Enroll in Engaged MD  Take prenatal vitamins, vitamin D 2000 IUs daily  Discussed that PAP and mammogram must be updated if appropriate based on age and clinical history and results received before treatment can begin  Discussed that treatment cannot proceed until checklist items are complete   6 week follow up with MD to review further management options (IVF consult).  Also schedule consult with Herminia Garcia to discuss egg donor options  Additional testing for BMI < 18 or > 40: NA    -Will Rx Aygestin now for management of abnormal uterine bleeding; also schedule TVUS and endometrial biopsy as above    If patient decides to pursue egg donor will also need:  >44 yo testing  Diagnostic hysteroscopy  Partner STDs, Sperm freeze, and genetic screening    Georgina Diallo  11/15/2023  12:45 PM

## 2023-11-20 ENCOUNTER — APPOINTMENT (OUTPATIENT)
Dept: RADIOLOGY | Facility: CLINIC | Age: 48
End: 2023-11-20
Payer: COMMERCIAL

## 2023-11-20 ENCOUNTER — APPOINTMENT (OUTPATIENT)
Dept: OBSTETRICS AND GYNECOLOGY | Facility: CLINIC | Age: 48
End: 2023-11-20
Payer: COMMERCIAL

## 2023-11-22 ENCOUNTER — ANCILLARY PROCEDURE (OUTPATIENT)
Dept: RADIOLOGY | Facility: CLINIC | Age: 48
End: 2023-11-22
Payer: COMMERCIAL

## 2023-11-22 DIAGNOSIS — N91.4 SECONDARY OLIGOMENORRHEA: ICD-10-CM

## 2023-11-22 PROCEDURE — 76856 US EXAM PELVIC COMPLETE: CPT | Performed by: RADIOLOGY

## 2023-11-22 PROCEDURE — 76830 TRANSVAGINAL US NON-OB: CPT

## 2023-11-22 PROCEDURE — 76830 TRANSVAGINAL US NON-OB: CPT | Performed by: RADIOLOGY

## 2023-12-18 ENCOUNTER — TELEMEDICINE (OUTPATIENT)
Dept: ENDOCRINOLOGY | Facility: CLINIC | Age: 48
End: 2023-12-18
Payer: COMMERCIAL

## 2023-12-18 VITALS — WEIGHT: 174 LBS | BODY MASS INDEX: 35.14 KG/M2

## 2023-12-18 DIAGNOSIS — Z31.41 FERTILITY TESTING: Primary | ICD-10-CM

## 2023-12-18 PROCEDURE — 99212 OFFICE O/P EST SF 10 MIN: CPT | Performed by: NURSE PRACTITIONER

## 2023-12-18 ASSESSMENT — ENCOUNTER SYMPTOMS
OCCASIONAL FEELINGS OF UNSTEADINESS: 0
LOSS OF SENSATION IN FEET: 0
DEPRESSION: 0

## 2023-12-18 ASSESSMENT — PATIENT HEALTH QUESTIONNAIRE - PHQ9
SUM OF ALL RESPONSES TO PHQ9 QUESTIONS 1 AND 2: 0
2. FEELING DOWN, DEPRESSED OR HOPELESS: NOT AT ALL
1. LITTLE INTEREST OR PLEASURE IN DOING THINGS: NOT AT ALL

## 2023-12-18 ASSESSMENT — PAIN SCALES - GENERAL: PAINLEVEL: 0-NO PAIN

## 2023-12-18 NOTE — PROGRESS NOTES
Virtual Visit    Follow Up Visit HPI    Patient is a 48 y.o.  female with  secondary infertility, AUB  presenting today for follow up visit.     Dr. Diallo started her on Aygestin and now she is not having any further bleeding.  She has only completed her pelvic ultrasound thus far.     Testing to date:   Result Date Done   Type and Screen: A 2021   Rh: POS 2021   Antibody: No results found for requested labs within last 1825 days.  No results found for requested labs within last 1825 days.   Rubella: No results found for requested labs within last 1825 days. No results found for requested labs within last 1825 days.   Varicella: No results found for requested labs within last 1825 days. No results found for requested labs within last 1825 days.   TSH: 0.31 (L; Ref range: 0.44 - 3.98 mIU/L) 2023   AMH: No results found for requested labs within last 1825 days. No results found for requested labs within last 1825 days.   PRL: No results found for requested labs within last 365 days. No results found for requested labs within last 365 days.   Testosterone: No results found for requested labs within last 365 days. No results found for requested labs within last 365 days.   DHEAS: No results found for requested labs within last 365 days. No results found for requested labs within last 365 days.   FSH: No results found for requested labs within last 365 days. No results found for requested labs within last 365 days.   17 OHP: No results found for requested labs within last 365 days. No results found for requested labs within last 365 days.   Hepatitis B surface antigen: No results found for requested labs within last 365 days. No results found for requested labs within last 365 days.   Hepatitis C antibody: No results found for requested labs within last 365 days. No results found for requested labs within last 365 days.   HIV ½ Antigen Antibody screen with reflex: No results found for  requested labs within last 365 days. No results found for requested labs within last 365 days.   Syphilis screening with reflex: No results found for requested labs within last 365 days. No results found for requested labs within last 365 days.   Other: n/a    Hysterosalpingogram: n/a  Saline Infused Sonography: n/a  GYN Pelvic Ultrasound: US PELVIS TRANSVAGINAL (2023):   IMPRESSION:  Endometrium measures 7 mm in dual layer thickness. If the patient is  experiencing postmenopausal bleeding, this is abnormal and requires  further evaluation to exclude an occult underlying neoplasm. With the  patient is not experiencing postmenopausal bleeding, this is within  normal limits.    Partner SA: No Assessment    Treatment to date: none    Past Medical History:   Diagnosis Date    Adjustment disorder, unspecified 2022    Adult situational stress disorder    Essential (primary) hypertension 2022    Hypertension    Hypertrophy of breast 2019    Large breasts    Hypothyroidism, unspecified 2021    Hypothyroidism    Irregular menstruation, unspecified 2014    Missed period    Other conditions influencing health status 2014    History of pregnancy    Personal history of diseases of the blood and blood-forming organs and certain disorders involving the immune mechanism     History of anemia    Personal history of other infectious and parasitic diseases 2019    History of surgical site infection    Personal history of other specified conditions 2016    History of fatigue    Personal history of other specified conditions 2021    History of dizziness    Sleep disorder, unspecified 2022    Sleep disturbance     Past Surgical History:   Procedure Laterality Date     SECTION, CLASSIC  2017     Section    GALLBLADDER SURGERY  2017    Gallbladder Surgery    OTHER SURGICAL HISTORY  2021    Laparoscopy    OTHER SURGICAL HISTORY  2019     Breast reduction    OTHER SURGICAL HISTORY  08/26/2019    Dilation and curettage    OTHER SURGICAL HISTORY  08/26/2019    [place] tooth extraction    TONSILLECTOMY  11/29/2017    Tonsillectomy     Current Outpatient Medications on File Prior to Visit   Medication Sig Dispense Refill    acetaminophen (Tylenol) 500 mg tablet Take 1 tablet (500 mg) by mouth. EVERY 4 TO 6 HOURS AS NEEDED.      brompheniramine-pseudoeph-DM 2-30-10 mg/5 mL syrup Take 10 mL by mouth. EVERY 4 TO 6 HOURS AS NEEDED.      desipramine (Norpramin) 10 mg tablet Take 1 tablet (10 mg) by mouth once daily.      desipramine (Norpramin) 25 mg tablet Take 1 tablet (25 mg) by mouth once daily.      diphenhydramine HCl (BENADRYL ORAL) Take 1 tablet by mouth once daily as needed.      drospirenone, contraceptive, (Slynd) 4 mg (28) tablet Take 1 tablet by mouth once daily.      ergocalciferol (Vitamin D-2) 1.25 MG (31861 UT) capsule Take 1 capsule (50,000 Units) by mouth 1 (one) time per week.      ferrous sulfate 325 (65 Fe) MG tablet Take 1 tablet (65 mg of iron) by mouth 3 times a day with meals.      fluticasone (Flonase) 50 mcg/actuation nasal spray Administer 2 sprays into each nostril once daily. Shake gently. Before first use, prime pump. After use, clean tip and replace cap.      hydroCHLOROthiazide (HYDRODiuril) 25 mg tablet TAKE 1 TABLET BY MOUTH DAILY 90 tablet 1    hydrOXYzine HCL (Atarax) 25 mg tablet Take 1 tablet (25 mg) by mouth once daily.      ibuprofen 800 mg tablet Take 1 tablet (800 mg) by mouth every 8 hours if needed (pain).      ketoconazole-iodoquinoL-hc 2-1-2.5 % cream Apply a thin layer to the affect area in the morning and at night 30 g 1    levocetirizine (Xyzal) 5 mg tablet Take 1 tablet (5 mg) by mouth once daily in the evening.      levothyroxine (Synthroid, Levoxyl) 112 mcg tablet Take 2 tablets (224 mcg) by mouth once daily.      levothyroxine (Synthroid, Levoxyl) 175 mcg tablet Take 1 tablet (175 mcg) by mouth once daily.       levothyroxine (Synthroid, Levoxyl) 200 mcg tablet Take 1 tablet (200 mcg) by mouth once daily. monday thru saturday and half pill on       levothyroxine (Tirosint) 150 mcg capsule Take 1 capsule (150 mcg) by mouth once daily.      niacin 500 mg tablet TAKE 1 TABLET (500 MG) BY MOUTH ONCE DAILY WITH A MEAL. 90 tablet 3    norethindrone (Aygestin) 5 mg tablet Take 1 tablet (5 mg) by mouth once daily. 30 tablet 11     No current facility-administered medications on file prior to visit.       BMI:   BMI Readings from Last 1 Encounters:   23 35.14 kg/m²     VITALS:  Wt 78.9 kg (174 lb)   LMP  (LMP Unknown)   BMI 35.14 kg/m²   LMP: No LMP recorded (lmp unknown).    ASSESSMENT   48 y.o.  female with secondary infertility x 2 years, and the following pertinent medical issues: AMA, SEUN .    COUNSELING  Reviewed age cut off for IVF.     Routine Testing  Fertility Center  STDs Within 1 year   Genetic carrier Waiver/Completed   T&S Within 1 year   AMH Within 1 year   TSH Within 1 year   Rubella/Varicella Within 5 years     BMI Testing  Fertility Center  CBC Within 1 year   CMP Within 1 year   HgbA1c Within 1 year   Mag, Phos, Vit D <18 Within 1 year   MFM > 40  REQ   Wt loss consult > 40 OPT     PLAN  No orders of the defined types were placed in this encounter.      FOLLOW UP   Consults: MFM consult: indication:already placed, AMLISA Smith MD  Take prenatal vitamins, vitamin D 2000 IUs daily  Discussed that treatment cannot proceed until checklist items are complete.   Additional testing for BMI < 18 or > 40: No.  Needs to make sure pap and mammogram up to date.  Schedule appointment to discuss egg donation with Herminia Garcia CNP.  Also will need IVF consult.  Keep MFM appointment.  Have labs that Dr. Diallo ordered completed.  May need to repeat pelvic ult. Also did not have endo biopsy done. Will folllow up with Dr. Diallo on this.       Cherrie PEARSON Jono  2023  10:57 AM

## 2023-12-19 ENCOUNTER — TELEPHONE (OUTPATIENT)
Dept: ENDOCRINOLOGY | Facility: CLINIC | Age: 48
End: 2023-12-19
Payer: COMMERCIAL

## 2023-12-19 NOTE — TELEPHONE ENCOUNTER
Called patient and left a detailed message to call back to schedule this and to let me know if any questions. I will also send a Stage I Diagnosticst message.      ----- Message from Georgina Diallo MD sent at 12/19/2023  1:37 PM EST -----  Yes she needs EMB  ----- Message -----  From: BAKARI Hidalgo  Sent: 12/18/2023   2:54 PM EST  To: Georgina Diallo MD    You wanted her to have an endo bx right? She only did a pelivc in radiology. Bleeding has now stopped on NET.    BAKARI Hidalgo

## 2023-12-19 NOTE — TELEPHONE ENCOUNTER
Returned patient's call. Patient aware she can call office back tomorrow to schedule biopsy for anytime. No further questions at this time.    ABDULKADIR THAYER on 12/19/23 at 4:09 PM.

## 2023-12-19 NOTE — TELEPHONE ENCOUNTER
Patient got a message from Dr Diallo to get scheduled for an Endo Biopsy   She is calling to schedule that please let her know when she has to schedule it

## 2023-12-21 ENCOUNTER — OFFICE VISIT (OUTPATIENT)
Dept: ENDOCRINOLOGY | Facility: CLINIC | Age: 48
End: 2023-12-21
Payer: COMMERCIAL

## 2023-12-21 VITALS
TEMPERATURE: 98 F | HEART RATE: 94 BPM | DIASTOLIC BLOOD PRESSURE: 88 MMHG | BODY MASS INDEX: 35.95 KG/M2 | SYSTOLIC BLOOD PRESSURE: 115 MMHG | WEIGHT: 178.3 LBS | HEIGHT: 59 IN

## 2023-12-21 DIAGNOSIS — Z01.812 ENCOUNTER FOR PREPROCEDURAL LABORATORY EXAMINATION: Primary | ICD-10-CM

## 2023-12-21 ASSESSMENT — PAIN SCALES - GENERAL: PAINLEVEL: 0-NO PAIN

## 2023-12-22 ENCOUNTER — OFFICE VISIT (OUTPATIENT)
Dept: ENDOCRINOLOGY | Facility: CLINIC | Age: 48
End: 2023-12-22
Payer: COMMERCIAL

## 2023-12-22 VITALS
TEMPERATURE: 97.7 F | HEART RATE: 89 BPM | DIASTOLIC BLOOD PRESSURE: 81 MMHG | SYSTOLIC BLOOD PRESSURE: 114 MMHG | WEIGHT: 177.7 LBS | HEIGHT: 59 IN | BODY MASS INDEX: 35.82 KG/M2

## 2023-12-22 DIAGNOSIS — N93.9 ABNORMAL UTERINE BLEEDING (AUB): Primary | ICD-10-CM

## 2023-12-22 DIAGNOSIS — N95.0 PMB (POSTMENOPAUSAL BLEEDING): ICD-10-CM

## 2023-12-22 PROCEDURE — 3008F BODY MASS INDEX DOCD: CPT

## 2023-12-22 PROCEDURE — 58100 BIOPSY OF UTERUS LINING: CPT

## 2023-12-22 PROCEDURE — 88305 TISSUE EXAM BY PATHOLOGIST: CPT | Mod: TC,SUR

## 2023-12-22 PROCEDURE — 88305 TISSUE EXAM BY PATHOLOGIST: CPT

## 2023-12-22 PROCEDURE — 3074F SYST BP LT 130 MM HG: CPT

## 2023-12-22 PROCEDURE — 3079F DIAST BP 80-89 MM HG: CPT

## 2023-12-22 PROCEDURE — 1036F TOBACCO NON-USER: CPT

## 2023-12-22 PROCEDURE — 88305 TISSUE EXAM BY PATHOLOGIST: CPT | Performed by: PATHOLOGY

## 2023-12-22 ASSESSMENT — PAIN SCALES - GENERAL: PAINLEVEL: 0-NO PAIN

## 2023-12-22 NOTE — PROGRESS NOTES
Procedure: Endometrial Biopsy for AUB/PMB/endometrium 7 mm on ultrasound on 11-    Patient Declined chaperone     Procedure indication: Abnormal uterine bleeding/PMB/thickened endometrium    Risks, benefits and alternatives were discussed with the patient. We discussed possible complications and risks. Written consent was obtained prior to the procedure and is detailed in the patient's record.    Pregnancy Test: Pregnancy test result reviewed- negative    Local anesthesia: No  Tenaculum applied: No  Cervical dilation: No  The endometrial biopsy was completed with an adequate sample obtained .      The patient tolerated the procedure well and had minimal bleeding noted at the conclusion of the procedure.    Sample sent to  Pathology    Plan: will call patient with results and next steps.      12/22/23 at 1:27 PM - MARTIN Saenz-CNP

## 2024-01-02 DIAGNOSIS — M79.644 THUMB PAIN, RIGHT: ICD-10-CM

## 2024-01-04 ENCOUNTER — DOCUMENTATION (OUTPATIENT)
Dept: ENDOCRINOLOGY | Facility: CLINIC | Age: 49
End: 2024-01-04
Payer: COMMERCIAL

## 2024-01-04 LAB
LABORATORY COMMENT REPORT: NORMAL
PATH REPORT.FINAL DX SPEC: NORMAL
PATH REPORT.GROSS SPEC: NORMAL
PATH REPORT.RELEVANT HX SPEC: NORMAL
PATH REPORT.TOTAL CANCER: NORMAL

## 2024-01-04 NOTE — PROGRESS NOTES
Message to patient sent regarding EMB result from 12-: Inactive endometrium consistent with progestin effect, no abnormal cells changes noted. She will follow up with JEANMARIE De La Cruz CNP for next steps.   01/04/24 at 2:39 PM - MARTIN Saenz-JOLYNN

## 2024-01-08 ENCOUNTER — ANCILLARY PROCEDURE (OUTPATIENT)
Dept: RADIOLOGY | Facility: CLINIC | Age: 49
End: 2024-01-08
Payer: COMMERCIAL

## 2024-01-08 ENCOUNTER — LAB (OUTPATIENT)
Dept: LAB | Facility: LAB | Age: 49
End: 2024-01-08
Payer: COMMERCIAL

## 2024-01-08 ENCOUNTER — TELEMEDICINE (OUTPATIENT)
Dept: ENDOCRINOLOGY | Facility: CLINIC | Age: 49
End: 2024-01-08
Payer: COMMERCIAL

## 2024-01-08 VITALS — HEIGHT: 59 IN | BODY MASS INDEX: 35.68 KG/M2 | WEIGHT: 177 LBS

## 2024-01-08 DIAGNOSIS — Z31.69 ENCOUNTER FOR PRECONCEPTION CONSULTATION: ICD-10-CM

## 2024-01-08 DIAGNOSIS — Z13.1 SCREENING FOR DIABETES MELLITUS: ICD-10-CM

## 2024-01-08 DIAGNOSIS — N91.4 SECONDARY OLIGOMENORRHEA: ICD-10-CM

## 2024-01-08 DIAGNOSIS — Z31.41 FERTILITY TESTING: ICD-10-CM

## 2024-01-08 DIAGNOSIS — Z11.59 ENCOUNTER FOR SCREENING FOR OTHER VIRAL DISEASES: ICD-10-CM

## 2024-01-08 DIAGNOSIS — N97.0 FEMALE INFERTILITY DUE TO DIMINISHED OVARIAN RESERVE: ICD-10-CM

## 2024-01-08 DIAGNOSIS — Z11.3 SCREENING FOR STDS (SEXUALLY TRANSMITTED DISEASES): ICD-10-CM

## 2024-01-08 DIAGNOSIS — Z01.83 ENCOUNTER FOR RH BLOOD TYPING: ICD-10-CM

## 2024-01-08 DIAGNOSIS — E78.5 HYPERLIPIDEMIA, UNSPECIFIED HYPERLIPIDEMIA TYPE: ICD-10-CM

## 2024-01-08 DIAGNOSIS — M79.644 THUMB PAIN, RIGHT: ICD-10-CM

## 2024-01-08 DIAGNOSIS — E28.8 FEMALE INFERTILITY DUE TO DIMINISHED OVARIAN RESERVE: ICD-10-CM

## 2024-01-08 DIAGNOSIS — Z31.89 ENCOUNTER FOR FERTILITY PLANNING: Primary | ICD-10-CM

## 2024-01-08 LAB
ABO GROUP (TYPE) IN BLOOD: NORMAL
ALBUMIN SERPL BCP-MCNC: 3.9 G/DL (ref 3.4–5)
ALP SERPL-CCNC: 64 U/L (ref 33–110)
ALT SERPL W P-5'-P-CCNC: 15 U/L (ref 7–45)
ANION GAP SERPL CALC-SCNC: 15 MMOL/L
ANTIBODY SCREEN: NORMAL
AST SERPL W P-5'-P-CCNC: 12 U/L (ref 9–39)
BILIRUB SERPL-MCNC: 0.4 MG/DL (ref 0–1.2)
BUN SERPL-MCNC: 11 MG/DL (ref 6–23)
CALCIUM SERPL-MCNC: 9.3 MG/DL (ref 8.6–10.6)
CHLORIDE SERPL-SCNC: 104 MMOL/L (ref 98–107)
CHOLEST SERPL-MCNC: 244 MG/DL (ref 0–199)
CHOLESTEROL/HDL RATIO: 7.5
CO2 SERPL-SCNC: 25 MMOL/L (ref 21–32)
CREAT SERPL-MCNC: 1.12 MG/DL (ref 0.5–1.05)
EGFRCR SERPLBLD CKD-EPI 2021: 60 ML/MIN/1.73M*2
EST. AVERAGE GLUCOSE BLD GHB EST-MCNC: 186 MG/DL
ESTRADIOL SERPL-MCNC: <19 PG/ML
FSH SERPL-ACNC: 8.3 IU/L
GLUCOSE SERPL-MCNC: 209 MG/DL (ref 74–99)
HBA1C MFR BLD: 8.1 %
HBV SURFACE AG SERPL QL IA: NONREACTIVE
HCV AB SER QL: NONREACTIVE
HDLC SERPL-MCNC: 32.4 MG/DL
HIV 1+2 AB+HIV1 P24 AG SERPL QL IA: NONREACTIVE
LDLC SERPL CALC-MCNC: 179 MG/DL
LH SERPL-ACNC: 1.6 IU/L
NON HDL CHOLESTEROL: 212 MG/DL (ref 0–149)
POTASSIUM SERPL-SCNC: 4.1 MMOL/L (ref 3.5–5.3)
PROGEST SERPL-MCNC: <0.3 NG/ML
PROLACTIN SERPL-MCNC: 4.5 UG/L (ref 3–20)
PROT SERPL-MCNC: 7.4 G/DL (ref 6.4–8.2)
RH FACTOR (ANTIGEN D): NORMAL
RUBV IGG SERPL IA-ACNC: 3.1 IA
RUBV IGG SERPL QL IA: POSITIVE
SODIUM SERPL-SCNC: 140 MMOL/L (ref 136–145)
T PALLIDUM AB SER QL: NONREACTIVE
TRIGL SERPL-MCNC: 163 MG/DL (ref 0–149)
TSH SERPL-ACNC: 0.55 MIU/L (ref 0.44–3.98)
VARICELLA ZOSTER IGG INDEX: 4.2 IA
VLDL: 33 MG/DL (ref 0–40)
VZV IGG SER QL IA: POSITIVE

## 2024-01-08 PROCEDURE — 84443 ASSAY THYROID STIM HORMONE: CPT

## 2024-01-08 PROCEDURE — 80061 LIPID PANEL: CPT

## 2024-01-08 PROCEDURE — 73140 X-RAY EXAM OF FINGER(S): CPT | Mod: RT

## 2024-01-08 PROCEDURE — 83002 ASSAY OF GONADOTROPIN (LH): CPT

## 2024-01-08 PROCEDURE — 86317 IMMUNOASSAY INFECTIOUS AGENT: CPT

## 2024-01-08 PROCEDURE — 86850 RBC ANTIBODY SCREEN: CPT

## 2024-01-08 PROCEDURE — 84144 ASSAY OF PROGESTERONE: CPT

## 2024-01-08 PROCEDURE — 86803 HEPATITIS C AB TEST: CPT

## 2024-01-08 PROCEDURE — 87340 HEPATITIS B SURFACE AG IA: CPT

## 2024-01-08 PROCEDURE — 83001 ASSAY OF GONADOTROPIN (FSH): CPT

## 2024-01-08 PROCEDURE — 82670 ASSAY OF TOTAL ESTRADIOL: CPT

## 2024-01-08 PROCEDURE — 87389 HIV-1 AG W/HIV-1&-2 AB AG IA: CPT

## 2024-01-08 PROCEDURE — 36415 COLL VENOUS BLD VENIPUNCTURE: CPT

## 2024-01-08 PROCEDURE — 73140 X-RAY EXAM OF FINGER(S): CPT | Mod: RIGHT SIDE | Performed by: RADIOLOGY

## 2024-01-08 PROCEDURE — 86901 BLOOD TYPING SEROLOGIC RH(D): CPT

## 2024-01-08 PROCEDURE — 84402 ASSAY OF FREE TESTOSTERONE: CPT

## 2024-01-08 PROCEDURE — 99213 OFFICE O/P EST LOW 20 MIN: CPT | Performed by: NURSE PRACTITIONER

## 2024-01-08 PROCEDURE — 84146 ASSAY OF PROLACTIN: CPT

## 2024-01-08 PROCEDURE — 86780 TREPONEMA PALLIDUM: CPT

## 2024-01-08 PROCEDURE — 80053 COMPREHEN METABOLIC PANEL: CPT

## 2024-01-08 PROCEDURE — 86787 VARICELLA-ZOSTER ANTIBODY: CPT

## 2024-01-08 PROCEDURE — 83516 IMMUNOASSAY NONANTIBODY: CPT

## 2024-01-08 PROCEDURE — 86900 BLOOD TYPING SEROLOGIC ABO: CPT

## 2024-01-08 PROCEDURE — 83036 HEMOGLOBIN GLYCOSYLATED A1C: CPT

## 2024-01-08 PROCEDURE — 87800 DETECT AGNT MULT DNA DIREC: CPT

## 2024-01-08 ASSESSMENT — PAIN SCALES - GENERAL: PAINLEVEL: 0-NO PAIN

## 2024-01-08 NOTE — PROGRESS NOTES
"  Virtual Visit    Follow Up Visit HPI    Patient is a 48 y.o.  female with Diminished Ovarian Reserve and Advanced maternal age and infertility  presenting today for follow up visit to discuss IVF using unidentified oocyte donor.   Patient of Dr. Diallo    Problem focused visit   BMI:   BMI Readings from Last 1 Encounters:   24 35.75 kg/m²     VITALS:  Ht 1.499 m (4' 11\")   Wt 80.3 kg (177 lb)   LMP  (LMP Unknown) Comment: Irregular Menstrual Cycles  BMI 35.75 kg/m²   LMP: No LMP recorded (lmp unknown).    ASSESSMENT   48 y.o.  female with  Secondary  infertility, suspected oligoovulation (possible perimenopause) and the following pertinent medical issues: Age (48), AUB with possible adnenomyosis, HTN, hypothryoidism .  Desires conception with IVF using Donor Oocytes   Partner SA: No Assessment    COUNSELING  Discussed difference between directed donation vs unidentified donation including but not limited to; anonymity, identifiability, financial cost, donor agencies, donor compensation, potential for incomplete donor medical history background information, and legal contract differences.     Discussed unidentified donor oocytes from cryobank vs obtaining oocyte donor from agency to do fresh IVF stimulation including but not limited to; # of oocytes and blastocyst rates with fresh vs frozen oocytes, family planning (how many children the couple wants) financial cost differences, timeline, donor screenings (communicable diseases, FDA screenings, genetics), proven donors vs first time donors, and legal contracts.    Routine Testing  Fertility Center  STDs Within 1 year   Genetic carrier Waiver/Completed   T&S Within 1 year   AMH Within 1 year   TSH Within 1 year   Rubella/Varicella Within 5 years     BMI Testing  Fertility Center  CBC Within 1 year   CMP Within 1 year   HgbA1c Within 1 year   Mag, Phos, Vit D <18 Within 1 year   MFM > 40  REQ   Wt loss consult > 40 OPT     PLAN  Orders " Placed This Encounter   Procedures    Referral to Psychology       FOLLOW UP   Consults: Psych consult: indicationdesires conception with donor oocytes   Engaged MD  Take prenatal vitamins, vitamin D 2000 IUs daily  Discussed that treatment cannot proceed until checklist items are complete.   Patient to schedule follow up visit in 1-2 months once pt has decided if she would like to proceed with Donor Egg Bank or Agency Egg Donor. Advised patient to arrange this now with the .  Chart to primary nurse for care coordination and patient check list/education, message sent to Lisa Luke for care coordination      Herminia Garcia  01/08/2024  1:55 PM

## 2024-01-09 ENCOUNTER — OFFICE VISIT (OUTPATIENT)
Dept: ORTHOPEDIC SURGERY | Facility: CLINIC | Age: 49
End: 2024-01-09
Payer: COMMERCIAL

## 2024-01-09 DIAGNOSIS — M19.049 CMC ARTHRITIS: Primary | ICD-10-CM

## 2024-01-09 LAB
C TRACH RRNA SPEC QL NAA+PROBE: NEGATIVE
N GONORRHOEA DNA SPEC QL PROBE+SIG AMP: NEGATIVE

## 2024-01-09 PROCEDURE — 3008F BODY MASS INDEX DOCD: CPT | Performed by: ORTHOPAEDIC SURGERY

## 2024-01-09 PROCEDURE — 1036F TOBACCO NON-USER: CPT | Performed by: ORTHOPAEDIC SURGERY

## 2024-01-09 PROCEDURE — 99214 OFFICE O/P EST MOD 30 MIN: CPT | Performed by: ORTHOPAEDIC SURGERY

## 2024-01-09 PROCEDURE — 20600 DRAIN/INJ JOINT/BURSA W/O US: CPT | Performed by: ORTHOPAEDIC SURGERY

## 2024-01-09 RX ORDER — TRIAMCINOLONE ACETONIDE 40 MG/ML
20 INJECTION, SUSPENSION INTRA-ARTICULAR; INTRAMUSCULAR
Status: COMPLETED | OUTPATIENT
Start: 2024-01-09 | End: 2024-01-09

## 2024-01-09 RX ORDER — LIDOCAINE HYDROCHLORIDE 10 MG/ML
0.5 INJECTION INFILTRATION; PERINEURAL
Status: COMPLETED | OUTPATIENT
Start: 2024-01-09 | End: 2024-01-09

## 2024-01-09 RX ADMIN — TRIAMCINOLONE ACETONIDE 20 MG: 40 INJECTION, SUSPENSION INTRA-ARTICULAR; INTRAMUSCULAR at 17:24

## 2024-01-09 RX ADMIN — LIDOCAINE HYDROCHLORIDE 0.5 ML: 10 INJECTION INFILTRATION; PERINEURAL at 17:24

## 2024-01-09 ASSESSMENT — PAIN - FUNCTIONAL ASSESSMENT: PAIN_FUNCTIONAL_ASSESSMENT: 0-10

## 2024-01-09 ASSESSMENT — PAIN SCALES - GENERAL: PAINLEVEL_OUTOF10: 4

## 2024-01-09 ASSESSMENT — PAIN DESCRIPTION - DESCRIPTORS: DESCRIPTORS: ACHING

## 2024-01-09 NOTE — PROGRESS NOTES
History of Present Illness:  Chief Complaint   Patient presents with    Right Hand - Pain     Thumb pain       48-year-old female presents for evaluation of right basilar thumb pain.  She was last seen in August 2021 at which time she underwent thumb trigger injection.  The locking and catching resolved following that injection.  She is not having any locking or catching at this time.  Pain is worse with gripping and pinching as well as twisting.  Pain has been worsening over the past month.  No numbness or tingling.    Past Medical History:   Diagnosis Date    Adjustment disorder, unspecified 11/22/2022    Adult situational stress disorder    Essential (primary) hypertension 11/22/2022    Hypertension    Hypertrophy of breast 01/17/2019    Large breasts    Hypothyroidism, unspecified 12/29/2021    Hypothyroidism    Irregular menstruation, unspecified 06/20/2014    Missed period    Other conditions influencing health status 06/20/2014    History of pregnancy    Personal history of diseases of the blood and blood-forming organs and certain disorders involving the immune mechanism     History of anemia    Personal history of other infectious and parasitic diseases 02/04/2019    History of surgical site infection    Personal history of other specified conditions 05/13/2016    History of fatigue    Personal history of other specified conditions 12/07/2021    History of dizziness    Sleep disorder, unspecified 01/14/2022    Sleep disturbance       Medication Documentation Review Audit       Reviewed by Shira Cordero CMA (Medical Assistant) on 01/09/24 at 0823      Medication Order Taking? Sig Documenting Provider Last Dose Status   Discontinued 01/08/24 1122   brompheniramine-pseudoeph-DM 2-30-10 mg/5 mL syrup [id number] No Take 10 mL by mouth. EVERY 4 TO 6 HOURS AS NEEDED. Historical Provider, MD Not Taking Active   desipramine (Norpramin) 10 mg tablet [id number] No Take 1 tablet (10 mg) by mouth once daily. Historical  Provider, MD Taking Active   desipramine (Norpramin) 25 mg tablet [id number] No Take 1 tablet (25 mg) by mouth once daily. Historical Provider, MD Taking Active   diphenhydramine HCl (BENADRYL ORAL) [id number] No Take 1 tablet by mouth once daily as needed. Historical Provider, MD Taking Active   drospirenone, contraceptive, (Slynd) 4 mg (28) tablet [id number] No Take 1 tablet by mouth once daily. Historical Provider, MD Taking Active   ergocalciferol (Vitamin D-2) 1.25 MG (42761 UT) capsule [id number] No Take 1 capsule (50,000 Units) by mouth 1 (one) time per week. Historical Provider, MD Taking Active   ferrous sulfate 325 (65 Fe) MG tablet [id number] No Take 1 tablet by mouth 3 times a day with meals. Historical Provider, MD Taking Active   fluticasone (Flonase) 50 mcg/actuation nasal spray [id number] No Administer 2 sprays into each nostril once daily. Shake gently. Before first use, prime pump. After use, clean tip and replace cap. Historical Provider, MD Taking Active   hydroCHLOROthiazide (HYDRODiuril) 25 mg tablet [id number] No TAKE 1 TABLET BY MOUTH DAILY Jeremy Cabral MD Taking Active   hydrOXYzine HCL (Atarax) 25 mg tablet [id number] No Take 1 tablet (25 mg) by mouth once daily. Historical MD Jose Taking Active   ibuprofen 800 mg tablet [id number] No Take 1 tablet (800 mg) by mouth every 8 hours if needed (pain). Historical Provider, MD Taking Active   ketoconazole-iodoquinoL-hc 2-1-2.5 % cream [id number] No Apply a thin layer to the affect area in the morning and at night Azalia Blanco PA-C Taking Active   levocetirizine (Xyzal) 5 mg tablet [id number] No Take 1 tablet (5 mg) by mouth once daily in the evening. Historical MD Jose Taking Active   levothyroxine (Synthroid, Levoxyl) 175 mcg tablet [id number] No Take 1 tablet (175 mcg) by mouth once daily. Historical MD Jose Taking Active   levothyroxine (Tirosint) 150 mcg capsule [id number] No Take 1 capsule (150 mcg) by mouth once  daily. Sulma Garcia MD Taking Active   niacin 500 mg tablet [id number] No TAKE 1 TABLET (500 MG) BY MOUTH ONCE DAILY WITH A MEAL. Jeremy Cabral MD Taking Active   norethindrone (Aygestin) 5 mg tablet [id number] No Take 1 tablet (5 mg) by mouth once daily. Georgina Diallo MD Taking Active                    No Known Allergies    Social History     Socioeconomic History    Marital status:      Spouse name: Not on file    Number of children: Not on file    Years of education: Not on file    Highest education level: Not on file   Occupational History    Not on file   Tobacco Use    Smoking status: Never    Smokeless tobacco: Never   Substance and Sexual Activity    Alcohol use: Yes     Comment: 3 drinks/month    Drug use: Never    Sexual activity: Yes     Partners: Male   Other Topics Concern    Not on file   Social History Narrative    Not on file     Social Determinants of Health     Financial Resource Strain: Not on file   Food Insecurity: Not on file   Transportation Needs: Not on file   Physical Activity: Not on file   Stress: Not on file   Social Connections: Not on file   Intimate Partner Violence: Not on file   Housing Stability: Not on file       Past Surgical History:   Procedure Laterality Date     SECTION, CLASSIC  2017     Section    GALLBLADDER SURGERY  2017    Gallbladder Surgery    OTHER SURGICAL HISTORY  2021    Laparoscopy    OTHER SURGICAL HISTORY  2019    Breast reduction    OTHER SURGICAL HISTORY  2019    Dilation and curettage    OTHER SURGICAL HISTORY  2019    [place] tooth extraction    TONSILLECTOMY  2017    Tonsillectomy        Review of Systems   GENERAL: Negative for malaise, significant weight loss, fever  MUSCULOSKELETAL: see HPI  NEURO:  Negative     Physical Examination  Constitutional: Appears well-developed and well-nourished.  Head: Normocephalic and atraumatic.  Eyes: EOMI  grossly  Cardiovascular: Intact distal pulses.   Respiratory: Effort normal. No respiratory distress.  Neurologic: Alert and oriented to person, place, and time.  Skin: Skin is warm and dry.  Hematologic / Lymphatic: No lymphedema, lymphangitis.  Psychiatric: normal mood and affect. Behavior is normal.   Musculoskeletal:  Right hand: Skin intact.  Normal rugal patterns.  Tenderness about thumb CMC joint with positive CMC grind.  No tenderness about thumb A1 pulley or first dorsal compartment.  Sensation intact throughout distally.  2+ radial pulse.    Radiographs: Right hand radiographs ordered and available for review demonstrate mild subluxation of CMC joint with mild degenerative changes.  No fracture/dislocation.     Assessment:  Patient with right thumb CMC synovitis     Plan:  Nature of the diagnosis was discussed with the patient.  Treatments of thumb CMC arthritis were discussed with the patient.  This includes nonoperative treatment with symptomatic splinting, anti-inflammatories, corticosteroid injections and possible surgical intervention if conservative measures prove unsuccessful.  Patient has already been using a brace and would like to proceed with corticosteroid injection to see if this helps relieve some of her symptoms.    Patient ID: Anastacio Mcknight is a 48 y.o. female.    S Inj/Asp: R thumb CMC on 1/9/2024 5:24 PM  Indications: pain  Details: 25 G needle (dorsoradial) approach  Medications: 20 mg triamcinolone acetonide 40 mg/mL; 0.5 mL lidocaine 10 mg/mL (1 %)  Outcome: tolerated well, no immediate complications  Procedure, treatment alternatives, risks and benefits explained, specific risks discussed. Consent was given by the patient. Immediately prior to procedure a time out was called to verify the correct patient, procedure, equipment, support staff and site/side marked as required. Patient was prepped and draped in the usual sterile fashion.

## 2024-01-09 NOTE — RESULT ENCOUNTER NOTE
Hi I saw you saw her yesterday for a visit.  Can you follow up with her regarding her HgA1C?  Recommend she endocrinology as this is consistent with diabetes.  Thank you.

## 2024-01-11 LAB — MIS SERPL-MCNC: <0.003 NG/ML

## 2024-01-13 LAB
TESTOSTERONE FREE (CHAN): 1.5 PG/ML (ref 0.1–6.4)
TESTOSTERONE,TOTAL,LC-MS/MS: 7 NG/DL (ref 2–45)

## 2024-01-18 ENCOUNTER — PATIENT MESSAGE (OUTPATIENT)
Dept: PRIMARY CARE | Facility: CLINIC | Age: 49
End: 2024-01-18
Payer: COMMERCIAL

## 2024-01-22 ENCOUNTER — TELEMEDICINE (OUTPATIENT)
Dept: BEHAVIORAL HEALTH | Facility: CLINIC | Age: 49
End: 2024-01-22
Payer: COMMERCIAL

## 2024-01-22 DIAGNOSIS — Z31.69 INFERTILITY COUNSELING: ICD-10-CM

## 2024-01-22 DIAGNOSIS — F43.20 ADULT SITUATIONAL STRESS DISORDER: Primary | ICD-10-CM

## 2024-01-22 PROCEDURE — 90791 PSYCH DIAGNOSTIC EVALUATION: CPT | Performed by: PSYCHOLOGIST
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
## 2024-01-24 ENCOUNTER — INITIAL PRENATAL (OUTPATIENT)
Dept: MATERNAL FETAL MEDICINE | Facility: CLINIC | Age: 49
End: 2024-01-24
Payer: COMMERCIAL

## 2024-01-24 VITALS — SYSTOLIC BLOOD PRESSURE: 126 MMHG | WEIGHT: 176.2 LBS | DIASTOLIC BLOOD PRESSURE: 90 MMHG | BODY MASS INDEX: 35.59 KG/M2

## 2024-01-24 DIAGNOSIS — Z31.69 ENCOUNTER FOR PRECONCEPTION CONSULTATION: Primary | ICD-10-CM

## 2024-01-24 DIAGNOSIS — I10 CHRONIC HYPERTENSION: ICD-10-CM

## 2024-01-24 DIAGNOSIS — E03.8 OTHER SPECIFIED HYPOTHYROIDISM: ICD-10-CM

## 2024-01-24 DIAGNOSIS — O09.529 AMA (ADVANCED MATERNAL AGE) MULTIGRAVIDA 35+, UNSPECIFIED TRIMESTER (HHS-HCC): ICD-10-CM

## 2024-01-24 DIAGNOSIS — E66.9 OBESITY, CLASS II, BMI 35-39.9: ICD-10-CM

## 2024-01-24 DIAGNOSIS — E11.9 TYPE 2 DIABETES MELLITUS WITHOUT COMPLICATION, WITHOUT LONG-TERM CURRENT USE OF INSULIN (MULTI): ICD-10-CM

## 2024-01-24 PROCEDURE — 99214 OFFICE O/P EST MOD 30 MIN: CPT | Performed by: OBSTETRICS & GYNECOLOGY

## 2024-01-24 NOTE — PROGRESS NOTES
Anastacio Mcknight is a 48 y.o.  presenting for an MFM preconception consultation from Dr. Diallo and [facility] due to AMA, chronic HTN and new diagnosis of T2DM. She is without complaints today. ROS is negative.      Issues:   Type 2 Diabetes- hemoglobin A1c recently 8.1%, A1c 4 years ago was borderline. Patient was unaware before today that this is diagnostic for T2DM.   Chronic HTN- stable on hydrochlorothiazide  AMA- would be > 51yo at delivery.   Class II obesity  Hypothyroidism- euthyroid on levothyroxine.   Hx of PEC in a prior pregnancy with abruption requiring transfusion.   Hx of GDM in a prior pregnancy    OBHx: ETOP x 3, SAB x 2,  at term x 4, CD x 1 at 36w due to abruption  GynHx: AUB  MedHx: cHTN, obesity, thyroid disease, T2DM  SurgHx: tonsillectomy, breast reduction, ovarian cystectomy  Meds: synthroid, niacin, hydrochlorothiazide, MVI  All: NKDA  FamHx: noncontributory   SocHx: no toxic habits    O: Visit Vitals  /90   Wt 79.9 kg (176 lb 3.2 oz)   LMP  (LMP Unknown) Comment: Irregular Menstrual Cycles   BMI 35.59 kg/m²   OB Status Having periods   Smoking Status Never   BSA 1.82 m²     Physical exam deferred for this consultative visit.     A/P: 49yo  presenting for a preconception counseling visit. We discussed the following:   Type 2 Diabetes  Reviewed that current A1c is diagnostic for diabetes, A1c >2 years ago was in the prediabetes range. Discussed that goal A1c for pregnancy is ideally < 6.5% but in extenuating circumstances could consider < 7% as a goal.     Reviewed the risks of diabetes in pregnancy namely the increased risk of birth defects and poor pregnancy outcomes that increase with rising A1c. Briefly discussed management of diabetes in pregnancy including insulin as 1st line therapy, serial growth scans and fetal surveillance.     She will follow up with her PCP for management of this new diagnosis.     2. AMA, > 51yo at  delivery    Women of very advanced maternal age (>45 years of age) have higher complication rates, multiple gestation rates, and an increase in rates of  birth and fetal growth restriction.  Details reviewed as followed.  Of note, as almost all the noted risks are increased in the setting of multiple gestation, the benefits of serial single embryo transfers rather than multiple embryo transfers should be reviewed in detail with JON.    The calculated risk of spontaneous loss >=45 years ranges from 66-93%.  In one study of over 148,000 ART pregnancies, the pregnancy loss rate after demonstrated fetal cardiac activity at 7 weeks was still over a third at >42 year.    There are sparse data on the effect of advanced maternal age on single gene disorders and epigenetic events, other than in the setting of assisted reproduction. Epidemiologic studies have reported an association between advanced maternal and paternal age and risk of autism spectrum disorders in offspring.       Several analyses have suggested that the risk of nonchromosomal anomalies also increases as women age. Cardiac anomalies, in particular, as well as clubfoot and CDH seem to increase with maternal age independent of aneuploidy.  Compared with the reference group of women 25 to 29 years of age, offspring of women >=40 years of age were at increased risk of several types of cardiac defects (aOR 2.2-2.9), as well as for esophageal atresia (aOR, 2.9; 95% CI 1.7-4.9), hypospadias (aOR, 2.0; 95% CI 1.4-3.0), and craniosynostosis (aOR, 1.6; 95% CI 1.1-2.4).  The rates of major congenital anomalies for offspring of women >=40 years of age is estimated at 3-5%.    Rates of obstetrical complications are increased in older women.  Women over age >=40 years had an eightfold increased risk of amniotic fluid embolism and threefold increased risk of obstetric shock compared with women age 25 to 29 years. Women in the age 45 to 49 category had a 16-fold  increased risk of renal failure and nearly fivefold increased risk of both obstetric intervention and admission to the intensive care unit. When the groups were stratified by age and compared with reference women age 25 to 29 years, the adjusted overall risk difference was 0.9 percent for women age 40 to 44 years, 1.6 percent for women age 45 to 49 years, and 6.4 percent for women age 50 or greater.     The combination of increased age and hypertension carries particularly high risk of pre-eclampsia and those associated morbidities.   The risk of pre-eclampsia increases to 5 to 10 percent in women over age 40 and is as high as 35 percent in women over age 50.    The incidence of gestational diabetes in the general obstetric population is 3 percent, rising to 7 to 12 percent in women over age 40, and 20 percent in women over age 50.  The prevalence of placental problems, such as abruptio placenta and placenta previa, is higher among older women. Multiparity accounts for significant proportion of the excess risk in both disorders. In fact, there is no significant correlation between maternal age and abruption when parity and hypertension are taken into account.        morbidity & mortality -- Advanced maternal age is responsible for a substantial proportion of the increased rate of low birth weight (LBW) and  delivery (PTD) observed in the past several years.  The excess  mortality experienced by older women is largely due to non-anomalous fetal deaths, which are often unexplained, even after controlling for risk factors such as hypertension, diabetes, antepartum bleeding, smoking, and multiple gestation. Nevertheless, the absolute risk of stillbirth in developed countries is small, even at very advanced maternal ages (1-2% after 28 weeks)    The primary  delivery rate increases to 36 percent for women 45 to 49 years, and 61 percent for women >=50 years. For comparison, the overall  primary  delivery rate for cespedes births in the [place] was approximately 22 percent during a similar time period.  When specific indications for  birth are evaluated, older women appear to have an increased risk of failure of labor to progress normally. The almost linear increase in the relationship between maternal age and uterine dysfunction is a continuous effect throughout the childbearing years.    Older women are at increased risk of maternal death, however, advancing age has only a small effect on the risk of maternal death in developed countries since the risk of dying during childbirth is already very low in these areas. In the [place] from  to , the risk of pregnancy-related maternal mortality for women 35 to 39 years of age was more than twice that of women aged 25 to 29 years (21 versus 9 per 100,000 live births); the risk in women over 40 years old was five-fold higher (46 versus 9 per 100,000 live births).    The experience of pregnancy at an advanced maternal age may impact subsequent health as the woman continues to age, both because of changes from the pregnancy itself and because of increased risk of pregnancy-related complications that negatively affect health. An observational study from the [place] Women's Health Initiative reported a trend toward increased risk of hemorrhagic stroke in women who had their last pregnancy at >=40 years of age compared with women who had their last pregnancy at age <40 years (hazard ratio 1.5, 95% CI 1.0-2.1). Additionally, preeclampsia, a risk factor for subsequent cardiovascular disease, is much more common in women of advancing maternal age.     Baseline labs recommended: PEC labs, A1c, TSH- completed   Baseline testing required: pap smear, mammogram, colonoscopy, EKG.     3. Chronic HTN  Women with chronic hypertension in pregnancy are at an increased risk for both maternal and fetal complications during pregnancy.   While antihypertensive therapy reduces the risk for maternal complications, it does not decrease the risk of fetal complications. For patients with mild to moderate chronic hypertension, anti-hypertensive medication should be started for patients with blood pressures over 140/90.  Blood pressures lower than this have not been associated with adverse maternal or fetal outcomes.  If blood pressure medications are needed, we recommend labetalol, amlodipine or nifedipine XL.  While all blood pressure medications cross the placenta, these are the most efficacious with the best side effect profile.  They are not teratogenic.    Chronic hypertension is a associated with a 10-25% risk of preeclampsia, a 1.5% risk of abruption and up to a 16% risk of intrauterine growth restriction.  If the patient has severe hypertension at the onset of pregnancy, the risk of superimposed preeclampsia is 50%, abruption is up to 10%,  birth up to 70% and intrauterine growth restriction up to 40%.    -Ok to continue hydrochlorothiazide for now but would switch medications during pregnancy.     4. Class II obesity  This was not addressed in detail today but recommended weight gain in pregnancy < 11-20lbs.     5. Thyroid disease  Euthyroid on current dose of synthroid. Will need qtrimester TSH in pregnancy with goal TSH < 2-2.5.     In summary, pregnancy is currently NOT recommended until diabetes management is at goal. At that time, pregnancy is not overtly contraindicated but will be inherently high risk based on multiple comorbidities.     Khushboo Jimenez MD  Maternal Fetal Medicine  Director of Fetal Intervention

## 2024-01-29 ENCOUNTER — OFFICE VISIT (OUTPATIENT)
Dept: PRIMARY CARE | Facility: CLINIC | Age: 49
End: 2024-01-29
Payer: COMMERCIAL

## 2024-01-29 VITALS
WEIGHT: 168 LBS | DIASTOLIC BLOOD PRESSURE: 75 MMHG | SYSTOLIC BLOOD PRESSURE: 105 MMHG | BODY MASS INDEX: 33.93 KG/M2 | HEART RATE: 103 BPM | TEMPERATURE: 99 F | OXYGEN SATURATION: 98 %

## 2024-01-29 DIAGNOSIS — E11.9 TYPE 2 DIABETES MELLITUS WITHOUT COMPLICATION, UNSPECIFIED WHETHER LONG TERM INSULIN USE (MULTI): Primary | ICD-10-CM

## 2024-01-29 DIAGNOSIS — E11.9 TYPE 2 DIABETES MELLITUS WITHOUT COMPLICATION, UNSPECIFIED WHETHER LONG TERM INSULIN USE (MULTI): ICD-10-CM

## 2024-01-29 PROCEDURE — 3078F DIAST BP <80 MM HG: CPT | Performed by: INTERNAL MEDICINE

## 2024-01-29 PROCEDURE — 3050F LDL-C >= 130 MG/DL: CPT | Performed by: INTERNAL MEDICINE

## 2024-01-29 PROCEDURE — 3074F SYST BP LT 130 MM HG: CPT | Performed by: INTERNAL MEDICINE

## 2024-01-29 PROCEDURE — 1036F TOBACCO NON-USER: CPT | Performed by: INTERNAL MEDICINE

## 2024-01-29 PROCEDURE — 3052F HG A1C>EQUAL 8.0%<EQUAL 9.0%: CPT | Performed by: INTERNAL MEDICINE

## 2024-01-29 PROCEDURE — 99214 OFFICE O/P EST MOD 30 MIN: CPT | Performed by: INTERNAL MEDICINE

## 2024-01-29 PROCEDURE — 3008F BODY MASS INDEX DOCD: CPT | Performed by: INTERNAL MEDICINE

## 2024-01-29 ASSESSMENT — COLUMBIA-SUICIDE SEVERITY RATING SCALE - C-SSRS
1. IN THE PAST MONTH, HAVE YOU WISHED YOU WERE DEAD OR WISHED YOU COULD GO TO SLEEP AND NOT WAKE UP?: NO
6. HAVE YOU EVER DONE ANYTHING, STARTED TO DO ANYTHING, OR PREPARED TO DO ANYTHING TO END YOUR LIFE?: NO
2. HAVE YOU ACTUALLY HAD ANY THOUGHTS OF KILLING YOURSELF?: NO
6. HAVE YOU EVER DONE ANYTHING, STARTED TO DO ANYTHING, OR PREPARED TO DO ANYTHING TO END YOUR LIFE?: YES

## 2024-01-29 ASSESSMENT — ENCOUNTER SYMPTOMS
OCCASIONAL FEELINGS OF UNSTEADINESS: 0
LOSS OF SENSATION IN FEET: 0
DEPRESSION: 0

## 2024-01-29 NOTE — PROGRESS NOTES
Subjective   Patient ID: Anastacio Mcknight is a 48 y.o. female who presents for FUV.    HPI  Patient in for a visit  Has not been feeling well   Review of Systems  General: see hpi  Ears, Nose, Throat : see hpi  Dermatologic: Negative for new skin conditions, rash  Respiratory: see hpi  Cardiovascular: Negative for chest pain, palpitations, or leg swelling  Gastrointestinal: Negative for nausea/vomiting, abdominal pain, changes in bowel habits  Neurological: Negative for dizziness see hpi headaches    Previous history  Past Medical History:   Diagnosis Date    Adjustment disorder, unspecified 2022    Adult situational stress disorder    Essential (primary) hypertension 2022    Hypertension    Hypertrophy of breast 2019    Large breasts    Hypothyroidism, unspecified 2021    Hypothyroidism    Irregular menstruation, unspecified 2014    Missed period    Other conditions influencing health status 2014    History of pregnancy    Personal history of diseases of the blood and blood-forming organs and certain disorders involving the immune mechanism     History of anemia    Personal history of other infectious and parasitic diseases 2019    History of surgical site infection    Personal history of other specified conditions 2016    History of fatigue    Personal history of other specified conditions 2021    History of dizziness    Sleep disorder, unspecified 2022    Sleep disturbance     Past Surgical History:   Procedure Laterality Date     SECTION, CLASSIC  2017     Section    GALLBLADDER SURGERY  2017    Gallbladder Surgery    OTHER SURGICAL HISTORY  2021    Laparoscopy    OTHER SURGICAL HISTORY  2019    Breast reduction    OTHER SURGICAL HISTORY  2019    Dilation and curettage    OTHER SURGICAL HISTORY  2019    [place] tooth extraction    TONSILLECTOMY  2017    Tonsillectomy     Social History     Tobacco  Use    Smoking status: Never    Smokeless tobacco: Never   Vaping Use    Vaping Use: Never used   Substance Use Topics    Alcohol use: Not Currently     Comment: 3 drinks/month    Drug use: Never     Family History   Problem Relation Name Age of Onset    Diabetes Father      Cancer Father      Other (cardiac disorder) Brother      Brain cancer Mother's Brother      Breast cancer Maternal Grandmother      Diabetes Maternal Grandfather      Heart failure Other grandmother     Cancer Other grandfather     Cancer Other uncle     Colon cancer Maternal Great-Grandfather       No Known Allergies  Current Outpatient Medications   Medication Instructions    brompheniramine-pseudoeph-DM 2-30-10 mg/5 mL syrup 10 mL, oral, EVERY 4 TO 6 HOURS AS NEEDED.<BR>    desipramine (Norpramin) 10 mg tablet 1 tablet, oral, Daily    desipramine (Norpramin) 25 mg tablet 1 tablet, oral, Daily    diphenhydramine HCl (BENADRYL ORAL) 1 tablet, oral, Daily PRN    drospirenone, contraceptive, (Slynd) 4 mg (28) tablet 1 tablet, oral, Daily    ergocalciferol (VITAMIN D-2) 50,000 Units, oral, Weekly    ferrous sulfate 325 (65 Fe) MG tablet 65 mg of iron, oral, 3 times daily with meals    fluticasone (Flonase) 50 mcg/actuation nasal spray 2 sprays, Each Nostril, Daily, Shake gently. Before first use, prime pump. After use, clean tip and replace cap.    hydroCHLOROthiazide (HYDRODIURIL) 25 mg, oral, Daily    hydrOXYzine HCL (Atarax) 25 mg tablet 1 tablet, oral, Daily    ibuprofen 800 mg, oral, Every 8 hours PRN    ketoconazole-iodoquinoL-hc 2-1-2.5 % cream Apply a thin layer to the affect area in the morning and at night    levocetirizine (XYZAL) 5 mg, oral, Every evening    levothyroxine (Synthroid, Levoxyl) 175 mcg tablet 1 tablet, oral, Daily    levothyroxine (TIROSINT) 150 mcg, oral, Daily    niacin 500 mg, oral, Daily with breakfast    norethindrone (AYGESTIN) 5 mg, oral, Daily       Objective       Physical Exam  Vital Signs: as recorded  above  General: Well groomed, well nourished  with her dtr with consent   Orientation:  Alert , oriented to time, place , and person   Mood and Affect:  Cooperative , no apparent distress normal affect  Skin: Good color, good turgor  Eyes: Extra ocular muscle movements intact, anicteric sclerae  Neck: Supple, full range of movement  Chest: Normal breath sounds, normal chest wall exam, symmetric, good air entry, clear to auscultation  Heart: Regular rate and rhythm, without murmur, gallop, or rubs  BACK:  no CTLS spine tenderness, no flank tenderness  Extremities: full range of movement  bilateral UE and bilateral LE,  no lower extremity edema  Neurological: Alert, oriented, cranial nerves II-XII grossly intact except for visual acuity  Sensation:  Intact   Gait: normal steady      Assessment/Plan   Anastacio Mcknight is a 48 y.o. female who presents for the concerns below:    Problem List Items Addressed This Visit    None    HYPERTENSION PLAN:  , taking blood pressure medication  Follow low salt diet, exercise as discussed, weight control. Continue current medications  Continue to lose weight , will drop her dose to 12.5 mg seh will split her current      HYPERCHOLESTEROLEMIA PLAN: improved , niacin continue current medications  Follow low cholesterol diet, encouraged high omega 3 fatty acid intake in diet, exercise, weight control. . Will Obtain AST/ALT, fasting lipid profile, creatine kinase  on statin if not done within past 3-6 months . Coenzyme Q10 200 mg a day if able to help increase HDL.    DIABETES MELLITUS PLAN:  hba1c 8.1 no medication, her creatinine was elevated BMIis 33.93 so will try ozempic lowest dose   Follow Diabetic diet, will consult nutrition if needed,  exercise as discussed, weight control., before our next visit will obtain HbA1c, BMP, urine microalbumin, ophthalmology exam.  Need Podiatry checks periodically.  No Fam hx of thyroid cancer just hyperthyroidism     RENAL INSUFFICIENCY monitoring  renal function, minimizing medication and supplements that may be aggravating ,, will recheck next visit , bp control needs to be consistent, discontinue otc supplements or systematically lower dosage or discontinue rx that may be causing renal disorder,  hydration very important, if worsening despite these measures  will order u/s kidney and urine protein and consult nephrology, if with electrolyte changes especially high potassium and feeling ill, then emergent evaluation is needed      Discussed with:   Return in : 1 month    Portions of this note were generated using digital voice recognition software, and may contain grammatical errors       Jeremy Cabral MD  01/29/24  3:39 PM

## 2024-01-30 RX ORDER — SEMAGLUTIDE 0.68 MG/ML
0.25 INJECTION, SOLUTION SUBCUTANEOUS
Qty: 3 ML | Refills: 2 | Status: SHIPPED | OUTPATIENT
Start: 2024-01-30 | End: 2024-03-26 | Stop reason: ALTCHOICE

## 2024-02-02 ENCOUNTER — TELEMEDICINE (OUTPATIENT)
Dept: BEHAVIORAL HEALTH | Facility: CLINIC | Age: 49
End: 2024-02-02
Payer: COMMERCIAL

## 2024-02-02 ENCOUNTER — TELEPHONE (OUTPATIENT)
Dept: PRIMARY CARE | Facility: CLINIC | Age: 49
End: 2024-02-02

## 2024-02-02 DIAGNOSIS — F41.1 GAD (GENERALIZED ANXIETY DISORDER): ICD-10-CM

## 2024-02-02 PROCEDURE — 90791 PSYCH DIAGNOSTIC EVALUATION: CPT | Performed by: COUNSELOR

## 2024-02-02 NOTE — TELEPHONE ENCOUNTER
Called Pt and relayed info.  She will contact insurance on Monday.  LG    ----- Message from Jeremy Cabral MD sent at 2/2/2024  3:38 PM EST -----  Regarding: FW: Tooth pulled  Contact: [phone]  I believe I did this yesterday around noon. Ty aq  ----- Message -----  From: Gissell Bell  Sent: 2/2/2024   2:20 PM EST  To: Jeremy Cabral MD  Subject: FW: Tooth pulled                                   ----- Message -----  From: Anastacio Mcknight  Sent: 2/2/2024   2:13 PM EST  To: [facility] [place] [id number] Clinical Support Staff  Subject: Tooth pulled                                     Let me know if you are able to fill out the Exception form please. then I will be able to get the Ozempic     Thank you

## 2024-02-02 NOTE — PROGRESS NOTES
Total Time: 55 min  Diagnosis: anxiety   Visit Type: epic  Reason for visit: NEW    Notes she is struggling with her new job; she would return, but there would be terms, like a work from home day. Notes she is not fully understanding her new role and is not getting support or training; they want to turn the whole dept over to her, but she does not feel comfortable, the program is all over the map, its [facility] and [facility], she still does not fully what to be doing, where things go, etc and met with her boss about it not going well  We discussed the role, is this the role she wants if she got the knowledge she stated yes, and she is vocal and has articulated what she needs but still feels like its not going anywhere  Notes she reached out to her boss's boss and still got a terrible response; began applying other places externally at this point  Notes her daughter is getting  soon and that's exciting but also makes her sad  Notes her youngest child is in an IOP and her other son turned 18 which is also making her sad  Notes she was recently diagnosed with type 2 diabetes and that was upsetting, but she did change her diet and already lost 15 pounds; notes she also was thinking about a baby but would need to do IVF but needs to be healthier  Idea of taking care of herself before she can take care of someone else; all the stress isn't helping her diet or ability to work out and engage in self care  Talked some about her children and where they are at in life; talked some about her childhood and while 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

## 2024-02-29 ENCOUNTER — APPOINTMENT (OUTPATIENT)
Dept: PRIMARY CARE | Facility: CLINIC | Age: 49
End: 2024-02-29
Payer: COMMERCIAL

## 2024-03-01 ENCOUNTER — TELEMEDICINE (OUTPATIENT)
Dept: BEHAVIORAL HEALTH | Facility: CLINIC | Age: 49
End: 2024-03-01
Payer: COMMERCIAL

## 2024-03-01 DIAGNOSIS — F41.1 GAD (GENERALIZED ANXIETY DISORDER): ICD-10-CM

## 2024-03-01 PROCEDURE — 90834 PSYTX W PT 45 MINUTES: CPT | Performed by: COUNSELOR

## 2024-03-01 PROCEDURE — 3008F BODY MASS INDEX DOCD: CPT | Performed by: COUNSELOR

## 2024-03-01 PROCEDURE — 1036F TOBACCO NON-USER: CPT | Performed by: COUNSELOR

## 2024-03-01 NOTE — PROGRESS NOTES
Total Time: 45 min  Diagnosis: anxiety   Visit Type: epic  Reason for visit: managing her worry    - recently had dental work which can cause stress  - notes her daughter is having a baby, which she is really excited about, but she will take guardianship, but that means she has to make her current job work so she can continue to be from home  - did reach out to her management about what she needs and is struggling with; entered a mentoring program to grow in her current position  - talked some about her HX with her mom and how that has impacted her life and decisions and choices with her kids   - nightmares (back, since she was 13, come and go)    focused on:  - coping skills  - emotional identification, regulation and expression   - decision making while managing the things that worry her

## 2024-03-04 ENCOUNTER — OFFICE VISIT (OUTPATIENT)
Dept: PRIMARY CARE | Facility: CLINIC | Age: 49
End: 2024-03-04
Payer: COMMERCIAL

## 2024-03-04 VITALS — DIASTOLIC BLOOD PRESSURE: 85 MMHG | SYSTOLIC BLOOD PRESSURE: 122 MMHG | WEIGHT: 172 LBS | BODY MASS INDEX: 34.74 KG/M2

## 2024-03-04 DIAGNOSIS — E11.69 TYPE 2 DIABETES MELLITUS WITH OTHER SPECIFIED COMPLICATION, UNSPECIFIED WHETHER LONG TERM INSULIN USE (MULTI): ICD-10-CM

## 2024-03-04 DIAGNOSIS — K04.7 DENTAL INFECTION: Primary | ICD-10-CM

## 2024-03-04 PROCEDURE — 3008F BODY MASS INDEX DOCD: CPT | Performed by: INTERNAL MEDICINE

## 2024-03-04 PROCEDURE — 3074F SYST BP LT 130 MM HG: CPT | Performed by: INTERNAL MEDICINE

## 2024-03-04 PROCEDURE — 3052F HG A1C>EQUAL 8.0%<EQUAL 9.0%: CPT | Performed by: INTERNAL MEDICINE

## 2024-03-04 PROCEDURE — 1036F TOBACCO NON-USER: CPT | Performed by: INTERNAL MEDICINE

## 2024-03-04 PROCEDURE — 3050F LDL-C >= 130 MG/DL: CPT | Performed by: INTERNAL MEDICINE

## 2024-03-04 PROCEDURE — 99214 OFFICE O/P EST MOD 30 MIN: CPT | Performed by: INTERNAL MEDICINE

## 2024-03-04 PROCEDURE — 3079F DIAST BP 80-89 MM HG: CPT | Performed by: INTERNAL MEDICINE

## 2024-03-04 RX ORDER — DULAGLUTIDE 0.75 MG/.5ML
0.75 INJECTION, SOLUTION SUBCUTANEOUS
Qty: 2 ML | Refills: 11 | Status: SHIPPED | OUTPATIENT
Start: 2024-03-04 | End: 2024-03-26 | Stop reason: ALTCHOICE

## 2024-03-04 RX ORDER — AMOXICILLIN 875 MG/1
875 TABLET, FILM COATED ORAL 2 TIMES DAILY
Qty: 20 TABLET | Refills: 0 | Status: SHIPPED | OUTPATIENT
Start: 2024-03-04 | End: 2024-03-14

## 2024-03-04 ASSESSMENT — PATIENT HEALTH QUESTIONNAIRE - PHQ9
1. LITTLE INTEREST OR PLEASURE IN DOING THINGS: NOT AT ALL
2. FEELING DOWN, DEPRESSED OR HOPELESS: NOT AT ALL
SUM OF ALL RESPONSES TO PHQ9 QUESTIONS 1 AND 2: 0

## 2024-03-04 ASSESSMENT — COLUMBIA-SUICIDE SEVERITY RATING SCALE - C-SSRS
2. HAVE YOU ACTUALLY HAD ANY THOUGHTS OF KILLING YOURSELF?: NO
1. IN THE PAST MONTH, HAVE YOU WISHED YOU WERE DEAD OR WISHED YOU COULD GO TO SLEEP AND NOT WAKE UP?: NO
6. HAVE YOU EVER DONE ANYTHING, STARTED TO DO ANYTHING, OR PREPARED TO DO ANYTHING TO END YOUR LIFE?: NO

## 2024-03-04 ASSESSMENT — ENCOUNTER SYMPTOMS
DEPRESSION: 0
OCCASIONAL FEELINGS OF UNSTEADINESS: 0
LOSS OF SENSATION IN FEET: 0

## 2024-03-04 NOTE — PROGRESS NOTES
Subjective   Patient ID: Anastacio Mcknight is a 48 y.o. female who presents for FUV, Blister (Lips.), and Jaw Pain (Cheek still swollen from tooth extraction 4 days ago.).    HPI  Patient in for a visit  Had dental tooth extraction Thursday, no antibiotics, has been taking ibuprofen not on an empty stomach  Blister on her lip and jaw pain, the former she had years ago     Review of Systems  General: Denies fever, chills, she still has night sweats not worse with the tooth pain and jaw,  Eyes: Negative for recent visual changes  Ears, Nose, Throat :  Negative for hearing changes, sinus discomfort  Dermatologic: Negative for new skin conditions, rash  Respiratory: Negative for wheezing, shortness of breath, cough  Cardiovascular: Negative for chest pain, palpitations, or leg swelling  Gastrointestinal: Negative for nausea/vomiting, abdominal pain, changes in bowel habits  Genitourinary NEGATIVE FOR URINARY INCONTINENCE urgency , frequency, discomfort   Musculoskeletal: see hpi  Neurological: Negative for headaches, dizziness    Previous history  Past Medical History:   Diagnosis Date    Adjustment disorder, unspecified 11/22/2022    Adult situational stress disorder    Essential (primary) hypertension 11/22/2022    Hypertension    Hypertrophy of breast 01/17/2019    Large breasts    Hypothyroidism, unspecified 12/29/2021    Hypothyroidism    Irregular menstruation, unspecified 06/20/2014    Missed period    Other conditions influencing health status 06/20/2014    History of pregnancy    Personal history of diseases of the blood and blood-forming organs and certain disorders involving the immune mechanism     History of anemia    Personal history of other infectious and parasitic diseases 02/04/2019    History of surgical site infection    Personal history of other specified conditions 05/13/2016    History of fatigue    Personal history of other specified conditions 12/07/2021    History of dizziness    Sleep disorder,  unspecified 2022    Sleep disturbance     Past Surgical History:   Procedure Laterality Date     SECTION, CLASSIC  2017     Section    GALLBLADDER SURGERY  2017    Gallbladder Surgery    OTHER SURGICAL HISTORY  2021    Laparoscopy    OTHER SURGICAL HISTORY  2019    Breast reduction    OTHER SURGICAL HISTORY  2019    Dilation and curettage    OTHER SURGICAL HISTORY  2019    [place] tooth extraction    TONSILLECTOMY  2017    Tonsillectomy     Social History     Tobacco Use    Smoking status: Never    Smokeless tobacco: Never   Vaping Use    Vaping Use: Never used   Substance Use Topics    Alcohol use: Not Currently     Comment: 3 drinks/month    Drug use: Never     Family History   Problem Relation Name Age of Onset    Diabetes Father      Cancer Father      Other (cardiac disorder) Brother      Brain cancer Mother's Brother      Breast cancer Maternal Grandmother      Diabetes Maternal Grandfather      Heart failure Other grandmother     Cancer Other grandfather     Cancer Other uncle     Colon cancer Maternal Great-Grandfather       No Known Allergies  Current Outpatient Medications   Medication Instructions    brompheniramine-pseudoeph-DM 2-30-10 mg/5 mL syrup 10 mL, oral, EVERY 4 TO 6 HOURS AS NEEDED.<BR>    desipramine (Norpramin) 10 mg tablet 1 tablet, oral, Daily    desipramine (Norpramin) 25 mg tablet 1 tablet, oral, Daily    diphenhydramine HCl (BENADRYL ORAL) 1 tablet, oral, Daily PRN    drospirenone, contraceptive, (Slynd) 4 mg (28) tablet 1 tablet, oral, Daily    ergocalciferol (VITAMIN D-2) 50,000 Units, oral, Weekly    ferrous sulfate 325 (65 Fe) MG tablet 65 mg of iron, oral, 3 times daily with meals    fluticasone (Flonase) 50 mcg/actuation nasal spray 2 sprays, Each Nostril, Daily, Shake gently. Before first use, prime pump. After use, clean tip and replace cap.    hydroCHLOROthiazide (HYDRODIURIL) 25 mg, oral, Daily    hydrOXYzine HCL  (Atarax) 25 mg tablet 1 tablet, oral, Daily    ibuprofen 800 mg, oral, Every 8 hours PRN    ketoconazole-iodoquinoL-hc 2-1-2.5 % cream Apply a thin layer to the affect area in the morning and at night    levocetirizine (XYZAL) 5 mg, oral, Every evening    levothyroxine (Synthroid, Levoxyl) 175 mcg tablet 1 tablet, oral, Daily    levothyroxine (TIROSINT) 150 mcg, oral, Daily    niacin 500 mg, oral, Daily with breakfast    norethindrone (AYGESTIN) 5 mg, oral, Daily    Ozempic 0.25 mg, subcutaneous, Weekly       Objective       Physical Exam  Vital Signs: as recorded above  General: Well groomed, well nourished   Orientation:  Alert , oriented to time, place , and person   Mood and Affect:  Cooperative , no apparent distress normal affect  Skin: Good color, good turgor  Eyes: Extra ocular muscle movements intact, anicteric sclerae  Neck: Supple, full range of movement   Jaw tender on the left jaw   Chest: Normal breath sounds, normal chest wall exam, symmetric, good air entry, clear to auscultation  Heart: Regular rate and rhythm, without murmur, gallop, or rubs  BACK:  no CTLS spine tenderness, no flank tenderness  Extremities: full range of movement  bilateral UE and bilateral LE,  no lower extremity edema  Neurological: Alert, oriented, cranial nerves II-XII grossly intact except for visual acuity  Sensation:  Intact   Gait: normal steady      Assessment/Plan   Anastacio Mcknight is a 48 y.o. female who presents for the concerns below:    Problem List Items Addressed This Visit    None    Jaw pain s/p tooth extraction did have dry socket pain, continue salt water solutions  Adding amoxicillin, ibuprofen with meals    DIABETES MELLITUS PLAN: elevated hba1c  Follow Diabetic diet, will consult nutrition if needed,  exercise as discussed, weight control., before our next visit will obtain HbA1c, BMP, urine microalbumin, ophthalmology exam.  Need Podiatry checks periodically.         Discussed with:   Return in  :    Portions of this note were generated using digital voice recognition software, and may contain grammatical errors       Jeremy Cabral MD  03/04/24  10:42 AM

## 2024-03-19 DIAGNOSIS — E11.69 TYPE 2 DIABETES MELLITUS WITH OTHER SPECIFIED COMPLICATION, UNSPECIFIED WHETHER LONG TERM INSULIN USE (MULTI): Primary | ICD-10-CM

## 2024-03-19 RX ORDER — METFORMIN HYDROCHLORIDE 500 MG/1
500 TABLET ORAL
Qty: 200 TABLET | Refills: 3 | Status: SHIPPED | OUTPATIENT
Start: 2024-03-19 | End: 2025-04-23

## 2024-03-26 ENCOUNTER — OFFICE VISIT (OUTPATIENT)
Dept: ENDOCRINOLOGY | Facility: CLINIC | Age: 49
End: 2024-03-26
Payer: COMMERCIAL

## 2024-03-26 VITALS
SYSTOLIC BLOOD PRESSURE: 120 MMHG | WEIGHT: 179.4 LBS | HEART RATE: 70 BPM | DIASTOLIC BLOOD PRESSURE: 70 MMHG | RESPIRATION RATE: 16 BRPM | HEIGHT: 59 IN | BODY MASS INDEX: 36.16 KG/M2

## 2024-03-26 DIAGNOSIS — E03.9 HYPOTHYROIDISM, UNSPECIFIED TYPE: Primary | ICD-10-CM

## 2024-03-26 PROCEDURE — 3078F DIAST BP <80 MM HG: CPT | Performed by: INTERNAL MEDICINE

## 2024-03-26 PROCEDURE — 3008F BODY MASS INDEX DOCD: CPT | Performed by: INTERNAL MEDICINE

## 2024-03-26 PROCEDURE — 3074F SYST BP LT 130 MM HG: CPT | Performed by: INTERNAL MEDICINE

## 2024-03-26 PROCEDURE — 99213 OFFICE O/P EST LOW 20 MIN: CPT | Performed by: INTERNAL MEDICINE

## 2024-03-26 PROCEDURE — 1036F TOBACCO NON-USER: CPT | Performed by: INTERNAL MEDICINE

## 2024-03-26 ASSESSMENT — ENCOUNTER SYMPTOMS
CHILLS: 0
SHORTNESS OF BREATH: 0
COUGH: 0
PALPITATIONS: 0
VOMITING: 0
FATIGUE: 0
NAUSEA: 0
HEADACHES: 0
DIARRHEA: 0
FEVER: 0

## 2024-03-26 NOTE — PROGRESS NOTES
Endocrinology: Follow up visit  Subjective   Patient ID: Anastacio Mcknight is a 48 y.o. female who presents for Hypothyroidism.    PCP: Jeremy Cabral MD    HPI  Last seen a year ago for follow up hypothyroidism.   Since last visit taking t4 as directed.  Recent tsh normal at 0.55.  taking t4 175 mcg daily.  Struggling with stress: daughter dropped out of college and is living with her, now pregnant.  Sons still driving her crazy.  She was also dxd with dm2: tried to get onto glp1 but must try metformin first: just started a few days ago.   Trying to work on eating and activity.  Knows she was eating way too much baked goods over the winter    Review of Systems   Constitutional:  Negative for chills, fatigue and fever.   Respiratory:  Negative for cough and shortness of breath.    Cardiovascular:  Negative for chest pain and palpitations.   Gastrointestinal:  Negative for diarrhea, nausea and vomiting.   Neurological:  Negative for headaches.       Patient Active Problem List   Diagnosis    Abnormal blood chemistry    Abnormal ultrasound of uterus    Abnormal menstruation    Adult situational stress disorder    Amenorrhea    Breakthrough bleeding on birth control pills    Blood pressure elevated without history of HTN    Cyst of ovary, left    Delayed surgical wound healing    Functional dyspepsia    History of uterine fibroid    Graves' ophthalmopathy    Hyperlipidemia    Hypertension    Hypothyroidism    Iron deficiency anemia    Irregular menses    Irritable bowel syndrome with diarrhea    URI with cough and congestion    Obesity, Class II, BMI 35-39.9    Restless legs    S/P saul    Sleep disturbance    Status post breast reduction    Trigger finger of right thumb    Urinary frequency    Urinary symptom or sign    Female infertility    Vitamin D deficiency    Sinus congestion    Firmness of abdomen    Abnormality of right breast on screening mammogram    Abdominal pain    Abnormal uterine bleeding  (AUB)        Home Meds:  Current Outpatient Medications   Medication Instructions    ergocalciferol (VITAMIN D-2) 50,000 Units, oral, Weekly    ferrous sulfate 325 (65 Fe) MG tablet 65 mg of iron, oral, 3 times daily with meals    fluticasone (Flonase) 50 mcg/actuation nasal spray 2 sprays, Each Nostril, Daily, Shake gently. Before first use, prime pump. After use, clean tip and replace cap.    hydroCHLOROthiazide (HYDRODIURIL) 25 mg, oral, Daily    hydrOXYzine HCL (Atarax) 25 mg tablet 1 tablet, oral, Daily    ibuprofen 800 mg, oral, Every 8 hours PRN    ketoconazole-iodoquinoL-hc 2-1-2.5 % cream Apply a thin layer to the affect area in the morning and at night    levocetirizine (XYZAL) 5 mg, oral, Every evening    levothyroxine (Synthroid, Levoxyl) 175 mcg tablet 1 tablet, oral, Daily    metFORMIN (GLUCOPHAGE) 500 mg, oral, 2 times daily with meals    niacin 500 mg, oral, Daily with breakfast    norethindrone (AYGESTIN) 5 mg, oral, Daily    Ozempic 0.25 mg, subcutaneous, Weekly    Trulicity 0.75 mg, subcutaneous, Weekly        No Known Allergies     Objective   Vitals:    03/26/24 1612   BP: 120/70   Pulse: 70   Resp: 16      Vitals:    03/26/24 1612   Weight: 81.4 kg (179 lb 6.4 oz)      Body mass index is 36.23 kg/m².   Physical Exam  Constitutional:       Appearance: Normal appearance. She is overweight.   HENT:      Head: Normocephalic and atraumatic.   Neck:      Thyroid: No thyroid mass, thyromegaly or thyroid tenderness.   Cardiovascular:      Rate and Rhythm: Normal rate and regular rhythm.      Heart sounds: No murmur heard.     No gallop.   Pulmonary:      Effort: Pulmonary effort is normal.      Breath sounds: Normal breath sounds.   Abdominal:      Palpations: Abdomen is soft.      Comments: benign   Neurological:      General: No focal deficit present.      Mental Status: She is alert and oriented to person, place, and time.      Deep Tendon Reflexes: Reflexes are normal and symmetric.   Psychiatric:   "       Behavior: Behavior is cooperative.         Labs:  Lab Results   Component Value Date    HGBA1C 8.1 (H) 01/08/2024    TSH 0.55 01/08/2024    FREET4 1.71 (H) 08/17/2023      No results found for: \"PR1\", \"THYROIDPAB\", \"TSI\"     Assessment/Plan   Problem List Items Addressed This Visit       Hypothyroidism - Primary     Thyroid levels look good  Continue current thyroid med dosing  Discussed importance of low carb diet and exercise  Follow up in one year    Electronically signed by:  Brenna Pinon MD 03/26/24 5:31 PM              "

## 2024-04-05 ENCOUNTER — OFFICE VISIT (OUTPATIENT)
Dept: BEHAVIORAL HEALTH | Facility: CLINIC | Age: 49
End: 2024-04-05
Payer: COMMERCIAL

## 2024-04-05 DIAGNOSIS — F41.1 GAD (GENERALIZED ANXIETY DISORDER): ICD-10-CM

## 2024-04-05 PROCEDURE — 3008F BODY MASS INDEX DOCD: CPT | Performed by: COUNSELOR

## 2024-04-05 PROCEDURE — 90834 PSYTX W PT 45 MINUTES: CPT | Performed by: COUNSELOR

## 2024-04-05 NOTE — PROGRESS NOTES
Total Time: 44 min  Diagnosis: anxiety  Visit Type: epic  Reason for visit: managing her worry     - notes she got a job offer at [employer], but wants to stay at , is waiting to hear back before making a decision  - notes the wedding is being postponed due to other issues and financial issues  - notes her nightmares have been better  - notes her stress was really getting to her, was really hard on herself notes they will take a vacation to de-stress  - did start taking her anti-anxiety meds again; concerns still with weight gain      focused on:  - coping skills, re-focus on herself  - emotional identification, regulation and expression   - decision making while managing the things that worry her

## 2024-04-09 ENCOUNTER — OFFICE VISIT (OUTPATIENT)
Dept: ORTHOPEDIC SURGERY | Facility: CLINIC | Age: 49
End: 2024-04-09
Payer: COMMERCIAL

## 2024-04-09 DIAGNOSIS — M65.30 TRIGGER FINGER, ACQUIRED: Primary | ICD-10-CM

## 2024-04-09 PROCEDURE — 99213 OFFICE O/P EST LOW 20 MIN: CPT | Performed by: ORTHOPAEDIC SURGERY

## 2024-04-09 PROCEDURE — 3008F BODY MASS INDEX DOCD: CPT | Performed by: ORTHOPAEDIC SURGERY

## 2024-04-09 PROCEDURE — 1036F TOBACCO NON-USER: CPT | Performed by: ORTHOPAEDIC SURGERY

## 2024-04-09 ASSESSMENT — PAIN SCALES - GENERAL: PAINLEVEL_OUTOF10: 2

## 2024-04-09 ASSESSMENT — PAIN - FUNCTIONAL ASSESSMENT: PAIN_FUNCTIONAL_ASSESSMENT: 0-10

## 2024-04-09 NOTE — PROGRESS NOTES
History of Present Illness:  Chief Complaint   Patient presents with    Left Hand - Follow-up     Left ring finger trigger     48-year-old female previously seen for right basilar thumb pain.  She underwent corticosteroid injection at her last visit.  Pain in the base of her thumb is still improved although she does still have occasional aching pains.  She is presenting today primarily for evaluation of her left ring finger.  It is quite stiff in the morning.  No specific locking or catching, but she does notice some soreness about the base of the digit.  This pain does seem to improve as she begins mobilizing finger.      Past Medical History:   Diagnosis Date    Adjustment disorder, unspecified 11/22/2022    Adult situational stress disorder    Essential (primary) hypertension 11/22/2022    Hypertension    Hypertrophy of breast 01/17/2019    Large breasts    Hypothyroidism, unspecified 12/29/2021    Hypothyroidism    Irregular menstruation, unspecified 06/20/2014    Missed period    Other conditions influencing health status 06/20/2014    History of pregnancy    Personal history of diseases of the blood and blood-forming organs and certain disorders involving the immune mechanism     History of anemia    Personal history of other infectious and parasitic diseases 02/04/2019    History of surgical site infection    Personal history of other specified conditions 05/13/2016    History of fatigue    Personal history of other specified conditions 12/07/2021    History of dizziness    Sleep disorder, unspecified 01/14/2022    Sleep disturbance       Medication Documentation Review Audit       Reviewed by Shira Cordero CMA (Medical Assistant) on 04/09/24 at 0842      Medication Order Taking? Sig Documenting Provider Last Dose Status   ergocalciferol (Vitamin D-2) 1.25 MG (89745 UT) capsule [id number] No Take 1 capsule (50,000 Units) by mouth 1 (one) time per week. Historical Provider, MD Taking Active   ferrous sulfate  325 (65 Fe) MG tablet [id number] No Take 1 tablet by mouth 3 times a day with meals. Historical Provider, MD Taking Active   fluticasone (Flonase) 50 mcg/actuation nasal spray [id number] No Administer 2 sprays into each nostril once daily. Shake gently. Before first use, prime pump. After use, clean tip and replace cap. Historical Provider, MD Taking Active   hydroCHLOROthiazide (HYDRODiuril) 25 mg tablet [id number] No TAKE 1 TABLET BY MOUTH DAILY Jeremy Cabral MD Taking Active   hydrOXYzine HCL (Atarax) 25 mg tablet [id number] No Take 1 tablet (25 mg) by mouth once daily. Historical Provider, MD Taking Active   ibuprofen 800 mg tablet [id number] No Take 1 tablet (800 mg) by mouth every 8 hours if needed (pain). Historical Provider, MD Taking Active   levocetirizine (Xyzal) 5 mg tablet [id number] No Take 1 tablet (5 mg) by mouth once daily in the evening. Historical Provider, MD Taking Active   levothyroxine (Synthroid, Levoxyl) 175 mcg tablet [id number] No Take 1 tablet (175 mcg) by mouth once daily. Historical Provider, MD Taking Active   metFORMIN (Glucophage) 500 mg tablet [id number] No Take 1 tablet (500 mg) by mouth 2 times a day with meals. Jeremy Cabral MD Taking Active   niacin 500 mg tablet [id number] No TAKE 1 TABLET (500 MG) BY MOUTH ONCE DAILY WITH A MEAL. Jeremy Cabral MD Taking Active   norethindrone (Aygestin) 5 mg tablet [id number] No Take 1 tablet (5 mg) by mouth once daily. Georgina Diallo MD Taking Active                    No Known Allergies    Social History     Socioeconomic History    Marital status:      Spouse name: Not on file    Number of children: Not on file    Years of education: Not on file    Highest education level: Not on file   Occupational History    Not on file   Tobacco Use    Smoking status: Never    Smokeless tobacco: Never   Vaping Use    Vaping Use: Never used   Substance and Sexual Activity    Alcohol use: Not Currently      Comment: 3 drinks/month    Drug use: Never    Sexual activity: Yes     Partners: Male   Other Topics Concern    Not on file   Social History Narrative    Not on file     Social Determinants of Health     Financial Resource Strain: Not on file   Food Insecurity: Not on file   Transportation Needs: Not on file   Physical Activity: Not on file   Stress: Not on file   Social Connections: Not on file   Intimate Partner Violence: Not on file   Housing Stability: Not on file       Past Surgical History:   Procedure Laterality Date     SECTION, CLASSIC  2017     Section    GALLBLADDER SURGERY  2017    Gallbladder Surgery    OTHER SURGICAL HISTORY  2021    Laparoscopy    OTHER SURGICAL HISTORY  2019    Breast reduction    OTHER SURGICAL HISTORY  2019    Dilation and curettage    OTHER SURGICAL HISTORY  2019    [place] tooth extraction    TONSILLECTOMY  2017    Tonsillectomy        Review of Systems   GENERAL: Negative for malaise, significant weight loss, fever  MUSCULOSKELETAL: see HPI  NEURO:  Negative     Physical Examination  Constitutional: Appears well-developed and well-nourished.  Head: Normocephalic and atraumatic.  Eyes: EOMI grossly  Cardiovascular: Intact distal pulses.   Respiratory: Effort normal. No respiratory distress.  Neurologic: Alert and oriented to person, place, and time.  Skin: Skin is warm and dry.  Hematologic / Lymphatic: No lymphedema, lymphangitis.  Psychiatric: normal mood and affect. Behavior is normal.   Musculoskeletal:  Left hand: Skin intact.  Normal rugal patterns.  No thenar or intrinsic wasting.  Negative Tinel's at level of carpal tunnel.  Negative Durkan's.  Tenderness about ring finger A1 pulley with slight crepitus.  No palpable catching.  0 cm DPC.    Assessment: Left ring finger stenosing tenosynovitis without locking.  Mostly symptomatic in the morning.     Plan: Risks and benefits of treatment options for trigger finger  were reviewed with the patient.  These include splinting, corticosteroid injections and, if conservative options fail, surgical release.  Patient prefers to begin with overnight splinting as the symptoms are mostly bothersome first thing in the morning.  If symptoms persist she will call for further follow-up and additional treatment options.

## 2024-04-11 ENCOUNTER — APPOINTMENT (OUTPATIENT)
Dept: PRIMARY CARE | Facility: CLINIC | Age: 49
End: 2024-04-11
Payer: COMMERCIAL

## 2024-04-13 DIAGNOSIS — E03.9 HYPOTHYROIDISM, UNSPECIFIED: ICD-10-CM

## 2024-04-15 RX ORDER — LEVOTHYROXINE SODIUM 175 UG/1
175 TABLET ORAL DAILY
Qty: 90 TABLET | Refills: 2 | Status: SHIPPED | OUTPATIENT
Start: 2024-04-15

## 2024-05-03 ENCOUNTER — TELEMEDICINE (OUTPATIENT)
Dept: BEHAVIORAL HEALTH | Facility: CLINIC | Age: 49
End: 2024-05-03
Payer: COMMERCIAL

## 2024-05-03 ENCOUNTER — APPOINTMENT (OUTPATIENT)
Dept: ENDOCRINOLOGY | Facility: CLINIC | Age: 49
End: 2024-05-03
Payer: COMMERCIAL

## 2024-05-03 DIAGNOSIS — F41.1 GAD (GENERALIZED ANXIETY DISORDER): ICD-10-CM

## 2024-05-03 PROCEDURE — 90832 PSYTX W PT 30 MINUTES: CPT | Performed by: COUNSELOR

## 2024-05-03 PROCEDURE — 3008F BODY MASS INDEX DOCD: CPT | Performed by: COUNSELOR

## 2024-05-03 NOTE — PROGRESS NOTES
Total Time: 26 min  Diagnosis: anxiety  Visit Type: epic  Reason for visit: managing her worry     - notes there is a lot going on, her grandfather passed away, her son has prom, the baby gender reveal  - she notes she got the job and is super excited; knows the role, but is still learning the system; notes a huge sense of relief already   - talked some about her sons prom date and worried about her and is she a good influence for him  - planning for her grandpa, he needs to be brought back up here, her grandmas ashes brought up, etc    focused on:  - coping skills, re-focus on herself  - emotional identification, regulation and expression   - decision making and working on her list with all the things she needs to manage just this weekend

## 2024-05-28 ENCOUNTER — LAB (OUTPATIENT)
Dept: LAB | Facility: LAB | Age: 49
End: 2024-05-28
Payer: COMMERCIAL

## 2024-05-28 DIAGNOSIS — E11.69 TYPE 2 DIABETES MELLITUS WITH OTHER SPECIFIED COMPLICATION, UNSPECIFIED WHETHER LONG TERM INSULIN USE (MULTI): ICD-10-CM

## 2024-05-28 LAB
ANION GAP SERPL CALC-SCNC: 15 MMOL/L (ref 10–20)
BUN SERPL-MCNC: 12 MG/DL (ref 6–23)
CALCIUM SERPL-MCNC: 9.6 MG/DL (ref 8.6–10.6)
CHLORIDE SERPL-SCNC: 102 MMOL/L (ref 98–107)
CO2 SERPL-SCNC: 24 MMOL/L (ref 21–32)
CREAT SERPL-MCNC: 1.19 MG/DL (ref 0.5–1.05)
EGFRCR SERPLBLD CKD-EPI 2021: 56 ML/MIN/1.73M*2
EST. AVERAGE GLUCOSE BLD GHB EST-MCNC: 226 MG/DL
GLUCOSE SERPL-MCNC: 181 MG/DL (ref 74–99)
HBA1C MFR BLD: 9.5 %
POTASSIUM SERPL-SCNC: 4.4 MMOL/L (ref 3.5–5.3)
SODIUM SERPL-SCNC: 137 MMOL/L (ref 136–145)

## 2024-05-28 PROCEDURE — 80048 BASIC METABOLIC PNL TOTAL CA: CPT

## 2024-05-28 PROCEDURE — 83036 HEMOGLOBIN GLYCOSYLATED A1C: CPT

## 2024-05-28 PROCEDURE — 36415 COLL VENOUS BLD VENIPUNCTURE: CPT

## 2024-05-30 ENCOUNTER — APPOINTMENT (OUTPATIENT)
Dept: ENDOCRINOLOGY | Facility: CLINIC | Age: 49
End: 2024-05-30
Payer: COMMERCIAL

## 2024-06-04 ENCOUNTER — APPOINTMENT (OUTPATIENT)
Dept: PRIMARY CARE | Facility: CLINIC | Age: 49
End: 2024-06-04
Payer: COMMERCIAL

## 2024-06-04 ENCOUNTER — OFFICE VISIT (OUTPATIENT)
Dept: PRIMARY CARE | Facility: CLINIC | Age: 49
End: 2024-06-04
Payer: COMMERCIAL

## 2024-06-04 VITALS
OXYGEN SATURATION: 97 % | WEIGHT: 173 LBS | DIASTOLIC BLOOD PRESSURE: 70 MMHG | HEART RATE: 84 BPM | SYSTOLIC BLOOD PRESSURE: 110 MMHG | BODY MASS INDEX: 34.94 KG/M2

## 2024-06-04 DIAGNOSIS — E11.9 TYPE 2 DIABETES MELLITUS WITHOUT COMPLICATION, UNSPECIFIED WHETHER LONG TERM INSULIN USE (MULTI): ICD-10-CM

## 2024-06-04 DIAGNOSIS — R79.9 ABNORMAL BLOOD CHEMISTRY: ICD-10-CM

## 2024-06-04 DIAGNOSIS — L29.9 ITCH OF SKIN: ICD-10-CM

## 2024-06-04 DIAGNOSIS — E78.00 HYPERCHOLESTEROLEMIA: Primary | ICD-10-CM

## 2024-06-04 PROCEDURE — 3074F SYST BP LT 130 MM HG: CPT | Performed by: INTERNAL MEDICINE

## 2024-06-04 PROCEDURE — 99214 OFFICE O/P EST MOD 30 MIN: CPT | Performed by: INTERNAL MEDICINE

## 2024-06-04 PROCEDURE — 3008F BODY MASS INDEX DOCD: CPT | Performed by: INTERNAL MEDICINE

## 2024-06-04 PROCEDURE — 3046F HEMOGLOBIN A1C LEVEL >9.0%: CPT | Performed by: INTERNAL MEDICINE

## 2024-06-04 PROCEDURE — 3050F LDL-C >= 130 MG/DL: CPT | Performed by: INTERNAL MEDICINE

## 2024-06-04 PROCEDURE — 3078F DIAST BP <80 MM HG: CPT | Performed by: INTERNAL MEDICINE

## 2024-06-04 RX ORDER — HYDROXYZINE HYDROCHLORIDE 25 MG/1
25 TABLET, FILM COATED ORAL DAILY
Qty: 90 TABLET | Refills: 0 | Status: SHIPPED | OUTPATIENT
Start: 2024-06-04

## 2024-06-04 RX ORDER — BLOOD-GLUCOSE TRANSMITTER
EACH MISCELLANEOUS
Qty: 1 EACH | Refills: 3 | Status: SHIPPED | OUTPATIENT
Start: 2024-06-04 | End: 2024-06-05

## 2024-06-04 RX ORDER — BLOOD-GLUCOSE SENSOR
EACH MISCELLANEOUS
Qty: 3 EACH | Refills: 3 | Status: SHIPPED | OUTPATIENT
Start: 2024-06-04 | End: 2024-06-05

## 2024-06-04 RX ORDER — ROSUVASTATIN CALCIUM 5 MG/1
5 TABLET, COATED ORAL DAILY
Qty: 30 TABLET | Refills: 1 | Status: SHIPPED | OUTPATIENT
Start: 2024-06-04 | End: 2024-08-03

## 2024-06-04 ASSESSMENT — ENCOUNTER SYMPTOMS
LOSS OF SENSATION IN FEET: 0
DEPRESSION: 0
OCCASIONAL FEELINGS OF UNSTEADINESS: 0

## 2024-06-04 ASSESSMENT — COLUMBIA-SUICIDE SEVERITY RATING SCALE - C-SSRS
6. HAVE YOU EVER DONE ANYTHING, STARTED TO DO ANYTHING, OR PREPARED TO DO ANYTHING TO END YOUR LIFE?: NO
2. HAVE YOU ACTUALLY HAD ANY THOUGHTS OF KILLING YOURSELF?: NO
1. IN THE PAST MONTH, HAVE YOU WISHED YOU WERE DEAD OR WISHED YOU COULD GO TO SLEEP AND NOT WAKE UP?: NO

## 2024-06-04 ASSESSMENT — PATIENT HEALTH QUESTIONNAIRE - PHQ9
SUM OF ALL RESPONSES TO PHQ9 QUESTIONS 1 AND 2: 0
1. LITTLE INTEREST OR PLEASURE IN DOING THINGS: NOT AT ALL
2. FEELING DOWN, DEPRESSED OR HOPELESS: NOT AT ALL

## 2024-06-04 NOTE — PROGRESS NOTES
Subjective   Patient ID: Anastacio Mcknight is a 48 y.o. female who presents for FUV.    HPI  Patient in for a visit  Stopped the niacin took it for a couple of months would take a benadryl but the itching stopped it last week still breaking out on her face   Taking metformin but her hba1c went up   Intermittent chest pain 2 seconds when she is typing not during exercise     Review of Systems  General: Denies fever, chills, night sweats,  Eyes: Negative for recent visual changes  Ears, Nose, Throat :  Negative for hearing changes, sinus discomfort  Dermatologic:  see hpi  Respiratory: Negative for wheezing, shortness of breath, cough  Cardiovascular: Negative for, palpitations, or leg swelling  Gastrointestinal: Negative for nausea/vomiting, abdominal pain, changes in bowel habits  Genitourinary Negative for Urinary Incontinence  urgency , frequency, discomfort   Musculoskeletal: see hpi  Neurological: Negative for headaches, dizziness    Previous history  Past Medical History:   Diagnosis Date   • Adjustment disorder, unspecified 11/22/2022    Adult situational stress disorder   • Essential (primary) hypertension 11/22/2022    Hypertension   • Hypertrophy of breast 01/17/2019    Large breasts   • Hypothyroidism, unspecified 12/29/2021    Hypothyroidism   • Irregular menstruation, unspecified 06/20/2014    Missed period   • Other conditions influencing health status 06/20/2014    History of pregnancy   • Personal history of diseases of the blood and blood-forming organs and certain disorders involving the immune mechanism     History of anemia   • Personal history of other infectious and parasitic diseases 02/04/2019    History of surgical site infection   • Personal history of other specified conditions 05/13/2016    History of fatigue   • Personal history of other specified conditions 12/07/2021    History of dizziness   • Sleep disorder, unspecified 01/14/2022    Sleep disturbance     Past Surgical History:    Procedure Laterality Date   •  SECTION, CLASSIC  2017     Section   • GALLBLADDER SURGERY  2017    Gallbladder Surgery   • OTHER SURGICAL HISTORY  2021    Laparoscopy   • OTHER SURGICAL HISTORY  2019    Breast reduction   • OTHER SURGICAL HISTORY  2019    Dilation and curettage   • OTHER SURGICAL HISTORY  2019    [place] tooth extraction   • TONSILLECTOMY  2017    Tonsillectomy     Social History     Tobacco Use   • Smoking status: Never   • Smokeless tobacco: Never   Vaping Use   • Vaping status: Never Used   Substance Use Topics   • Alcohol use: Yes     Alcohol/week: 2.0 standard drinks of alcohol     Types: 1 Glasses of wine, 1 Standard drinks or equivalent per week     Comment: 3 drinks/month   • Drug use: Never     Family History   Problem Relation Name Age of Onset   • Diabetes Father     • Cancer Father     • Other (cardiac disorder) Brother     • Brain cancer Mother's Brother     • Breast cancer Maternal Grandmother     • Diabetes Maternal Grandfather     • Heart failure Other grandmother    • Cancer Other grandfather    • Cancer Other uncle    • Colon cancer Maternal Great-Grandfather       No Known Allergies  Current Outpatient Medications   Medication Instructions   • ergocalciferol (VITAMIN D-2) 50,000 Units, oral, Once Weekly   • ferrous sulfate 325 (65 Fe) MG tablet 65 mg of iron, oral, 3 times daily (morning, midday, late afternoon)   • fluticasone (Flonase) 50 mcg/actuation nasal spray 2 sprays, Each Nostril, Daily, Shake gently. Before first use, prime pump. After use, clean tip and replace cap.   • hydroCHLOROthiazide (HYDRODIURIL) 25 mg, oral, Daily   • hydrOXYzine HCL (Atarax) 25 mg tablet 1 tablet, oral, Daily   • ibuprofen 800 mg, oral, Every 8 hours PRN   • levocetirizine (XYZAL) 5 mg, oral, Every evening   • levothyroxine (SYNTHROID, LEVOXYL) 175 mcg, oral, Daily   • metFORMIN (GLUCOPHAGE) 500 mg, oral, 2 times daily (morning and late  afternoon)   • niacin 500 mg, oral, Daily with breakfast   • norethindrone (AYGESTIN) 5 mg, oral, Daily       Objective       Physical Exam    Vital Signs: as recorded above  General: Well groomed, well nourished with her dtr   Orientation:  Alert , oriented to time, place , and person   Mood and Affect:  Cooperative , no apparent distress normal affect  Skin: Good color, good turgor  Eyes: Extra ocular muscle movements intact, anicteric sclerae  Neck: Supple, full range of movement  Chest: Normal breath sounds, normal chest wall exam, symmetric, good air entry, clear to auscultation  Heart: Regular rate and rhythm, without murmur, gallop, or rubs  BACK:  no CTLS spine tenderness, no flank tenderness  Extremities: full range of movement  bilateral UE and bilateral LE,  no lower extremity edema  Neurological: Alert, oriented, cranial nerves II-XII grossly intact except for visual acuity  Sensation:  Intact   Gait: normal steady      Assessment/Plan   Anastacio Mcknight is a 48 y.o. female who presents for the concerns below:    Problem List Items Addressed This Visit    None  ITCHING Reaction to niacin, in a week recheck     HYPERCHOLESTEROLEMIA PLAN: medications  Follow low cholesterol diet, encouraged high omega 3 fatty acid intake in diet, exercise, weight control. . Will Obtain AST/ALT, fasting lipid profile, creatine kinase  on statin if not done within past 3-6 months . Coenzyme Q10 200 mg a day if able to help increase HDL.      DIABETES MELLITUS PLAN:Stable with current medication regimen  Follow Diabetic diet, will consult nutrition if needed,  exercise as discussed, weight control., before our next visit will obtain HbA1c, BMP, urine microalbumin, ophthalmology exam.  Need Podiatry checks periodically.           Discussed with:   Return in :    Portions of this note were generated using digital voice recognition software, and may contain grammatical errors       Jeremy Cabral,  MD  06/04/24  3:18 PM

## 2024-06-04 NOTE — PATIENT INSTRUCTIONS
After the itching has resolved can start the rosuvastatin one  a day and then check your cholesterol and liver test in a month     End of June if you are doing well with the rosuvastatin , then increase the metformin to 2 tabs twice a day  The niacin may have caused your sugars to go up   Consider the DEXcom 6 continuous glucose monitor   Consider the cardiac CT to check your calcium score     Pending your appointment with Dr Pinon I will see you in 4 months

## 2024-06-05 DIAGNOSIS — E11.69 TYPE 2 DIABETES MELLITUS WITH OTHER SPECIFIED COMPLICATION, UNSPECIFIED WHETHER LONG TERM INSULIN USE (MULTI): ICD-10-CM

## 2024-06-05 DIAGNOSIS — N91.4 SECONDARY OLIGOMENORRHEA: ICD-10-CM

## 2024-06-05 RX ORDER — METFORMIN HYDROCHLORIDE 500 MG/1
500 TABLET ORAL
Qty: 200 TABLET | Refills: 3 | Status: CANCELLED | OUTPATIENT
Start: 2024-06-05 | End: 2025-07-10

## 2024-06-05 RX ORDER — BLOOD-GLUCOSE SENSOR
EACH MISCELLANEOUS
Qty: 3 EACH | Refills: 3 | Status: SHIPPED | OUTPATIENT
Start: 2024-06-05

## 2024-06-05 RX ORDER — BLOOD-GLUCOSE TRANSMITTER
EACH MISCELLANEOUS
Qty: 1 EACH | Refills: 3 | Status: SHIPPED | OUTPATIENT
Start: 2024-06-05

## 2024-06-06 ENCOUNTER — OFFICE VISIT (OUTPATIENT)
Dept: ENDOCRINOLOGY | Facility: CLINIC | Age: 49
End: 2024-06-06
Payer: COMMERCIAL

## 2024-06-06 VITALS
RESPIRATION RATE: 16 BRPM | HEIGHT: 59 IN | SYSTOLIC BLOOD PRESSURE: 114 MMHG | DIASTOLIC BLOOD PRESSURE: 70 MMHG | HEART RATE: 80 BPM | WEIGHT: 180.4 LBS | BODY MASS INDEX: 36.37 KG/M2

## 2024-06-06 DIAGNOSIS — E11.9 TYPE 2 DIABETES MELLITUS WITHOUT COMPLICATION, WITHOUT LONG-TERM CURRENT USE OF INSULIN (MULTI): ICD-10-CM

## 2024-06-06 DIAGNOSIS — E03.9 HYPOTHYROIDISM, UNSPECIFIED TYPE: Primary | ICD-10-CM

## 2024-06-06 PROCEDURE — 1036F TOBACCO NON-USER: CPT | Performed by: INTERNAL MEDICINE

## 2024-06-06 PROCEDURE — 3046F HEMOGLOBIN A1C LEVEL >9.0%: CPT | Performed by: INTERNAL MEDICINE

## 2024-06-06 PROCEDURE — 3074F SYST BP LT 130 MM HG: CPT | Performed by: INTERNAL MEDICINE

## 2024-06-06 PROCEDURE — 3008F BODY MASS INDEX DOCD: CPT | Performed by: INTERNAL MEDICINE

## 2024-06-06 PROCEDURE — 3078F DIAST BP <80 MM HG: CPT | Performed by: INTERNAL MEDICINE

## 2024-06-06 PROCEDURE — 99213 OFFICE O/P EST LOW 20 MIN: CPT | Performed by: INTERNAL MEDICINE

## 2024-06-06 PROCEDURE — 3050F LDL-C >= 130 MG/DL: CPT | Performed by: INTERNAL MEDICINE

## 2024-06-06 RX ORDER — BLOOD-GLUCOSE SENSOR
EACH MISCELLANEOUS
Qty: 9 EACH | Refills: 3 | Status: SHIPPED | OUTPATIENT
Start: 2024-06-06

## 2024-06-06 RX ORDER — SEMAGLUTIDE 0.68 MG/ML
0.5 INJECTION, SOLUTION SUBCUTANEOUS
Qty: 9 ML | Refills: 1 | Status: SHIPPED | OUTPATIENT
Start: 2024-06-06

## 2024-06-06 ASSESSMENT — ENCOUNTER SYMPTOMS
CHILLS: 0
HEADACHES: 0
COUGH: 0
VOMITING: 0
FEVER: 0
DIARRHEA: 0
NAUSEA: 0
FATIGUE: 0
SHORTNESS OF BREATH: 0
PALPITATIONS: 0

## 2024-06-06 NOTE — PROGRESS NOTES
Endocrinology: Follow up visit  Subjective   Patient ID: Anastacio Mcknight is a 48 y.o. female who presents for Hypothyroidism.    PCP: Jeremy Cabral MD    HPI  Since last visit really struggling with dm2.   A1c up at 9.5.   taking metformin.   Last time tsh ok on current t4 dose.  Trying to work on eating and activity    Review of Systems   Constitutional:  Negative for chills, fatigue and fever.   Respiratory:  Negative for cough and shortness of breath.    Cardiovascular:  Negative for chest pain and palpitations.   Gastrointestinal:  Negative for diarrhea, nausea and vomiting.   Neurological:  Negative for headaches.       Patient Active Problem List   Diagnosis    Abnormal blood chemistry    Abnormal ultrasound of uterus    Abnormal menstruation    Adult situational stress disorder    Amenorrhea    Breakthrough bleeding on birth control pills    Blood pressure elevated without history of HTN    Cyst of ovary, left    Delayed surgical wound healing    Functional dyspepsia    History of uterine fibroid    Graves' ophthalmopathy    Hyperlipidemia    Hypertension    Hypothyroidism    Iron deficiency anemia    Irregular menses    Irritable bowel syndrome with diarrhea    URI with cough and congestion    Obesity, Class II, BMI 35-39.9    Restless legs    S/P saul    Sleep disturbance    Status post breast reduction    Trigger finger of right thumb    Urinary frequency    Urinary symptom or sign    Female infertility    Vitamin D deficiency    Sinus congestion    Firmness of abdomen    Abnormality of right breast on screening mammogram    Abdominal pain    Abnormal uterine bleeding (AUB)        Home Meds:  Current Outpatient Medications   Medication Instructions    Dexcom G6 Sensor device APPLY 1 SENSOR EVERY 10 DAYS    Dexcom G6 Transmitter device USE AS INSTRUCTED    ergocalciferol (VITAMIN D-2) 50,000 Units, oral, Once Weekly    ferrous sulfate 325 (65 Fe) MG tablet 65 mg of iron, oral, 3 times  "daily (morning, midday, late afternoon)    fluticasone (Flonase) 50 mcg/actuation nasal spray 2 sprays, Each Nostril, Daily, Shake gently. Before first use, prime pump. After use, clean tip and replace cap.    hydrOXYzine HCL (ATARAX) 25 mg, oral, Daily    ibuprofen 800 mg, oral, Every 8 hours PRN    levocetirizine (XYZAL) 5 mg, oral, Every evening    levothyroxine (SYNTHROID, LEVOXYL) 175 mcg, oral, Daily    metFORMIN (GLUCOPHAGE) 500 mg, oral, 2 times daily (morning and late afternoon)    norethindrone (AYGESTIN) 5 mg, oral, Daily    rosuvastatin (CRESTOR) 5 mg, oral, Daily        No Known Allergies     Objective   Vitals:    06/06/24 1517   BP: 114/70   Pulse: 80   Resp: 16      Vitals:    06/06/24 1517   Weight: 81.8 kg (180 lb 6.4 oz)      Body mass index is 36.44 kg/m².   Physical Exam  Constitutional:       Appearance: Normal appearance. She is overweight.   HENT:      Head: Normocephalic and atraumatic.   Neck:      Thyroid: No thyroid mass, thyromegaly or thyroid tenderness.   Cardiovascular:      Rate and Rhythm: Normal rate and regular rhythm.      Heart sounds: No murmur heard.     No gallop.   Pulmonary:      Effort: Pulmonary effort is normal.      Breath sounds: Normal breath sounds.   Abdominal:      Palpations: Abdomen is soft.      Comments: benign   Neurological:      General: No focal deficit present.      Mental Status: She is alert and oriented to person, place, and time.      Deep Tendon Reflexes: Reflexes are normal and symmetric.   Psychiatric:         Behavior: Behavior is cooperative.         Labs:  Lab Results   Component Value Date    HGBA1C 9.5 (H) 05/28/2024    TSH 0.55 01/08/2024    FREET4 1.71 (H) 08/17/2023      No results found for: \"PR1\", \"THYROIDPAB\", \"TSI\"     Assessment/Plan   Problem List Items Addressed This Visit       Hypothyroidism - Primary     Other Visit Diagnoses       Type 2 diabetes mellitus without complication, without long-term current use of insulin (Multi)     "        Dm2:  Discussed course  Will start ozempic in addition to the metformin  Call in dexcom to minoff  Hypothyroidism:  'continue current t4 dosing      Electronically signed by:  Brenna Pinon MD 06/06/24 3:36 PM

## 2024-06-07 PROCEDURE — RXMED WILLOW AMBULATORY MEDICATION CHARGE

## 2024-06-08 ENCOUNTER — PHARMACY VISIT (OUTPATIENT)
Dept: PHARMACY | Facility: CLINIC | Age: 49
End: 2024-06-08
Payer: COMMERCIAL

## 2024-06-10 RX ORDER — NORETHINDRONE 5 MG/1
5 TABLET ORAL DAILY
Qty: 30 TABLET | Refills: 6 | Status: SHIPPED | OUTPATIENT
Start: 2024-06-10 | End: 2025-06-10

## 2024-06-13 DIAGNOSIS — L29.9 ITCH OF SKIN: ICD-10-CM

## 2024-06-13 RX ORDER — HYDROXYZINE HYDROCHLORIDE 25 MG/1
25 TABLET, FILM COATED ORAL DAILY
Qty: 90 TABLET | Refills: 0 | Status: SHIPPED | OUTPATIENT
Start: 2024-06-13

## 2024-06-14 ENCOUNTER — TELEPHONE (OUTPATIENT)
Dept: ENDOCRINOLOGY | Facility: CLINIC | Age: 49
End: 2024-06-14
Payer: COMMERCIAL

## 2024-06-14 DIAGNOSIS — Z31.41 FERTILITY TESTING: ICD-10-CM

## 2024-06-14 NOTE — TELEPHONE ENCOUNTER
Returned patient's call. Patient is calling and c/o continuous bleeding while being on Norethindrone. Patient has been on norethindrone since March, and was simultaneously taking SLYND, when not supposed to take at same time. She then stopped the SLYND, and just continued on Norethindrone 5mg daily. She is calling today that she is still bleeding, and it ranges from dark brown spotting to full period bleeding. She is aware to go to ED if she experiences any pain, dizziness or lightheadedness. She has FUV with Herminia next week, but wanted to see if there is anything she can do in the mean time.     Will review with Herminia Garcia today, and contact patient.      ABDULKADIR THAYER on 6/14/24 at 1:29 PM.       After discussing with herminia and Dr. Diallo, patient to increase to Aygestin 10mg daily and monitor bleeding. She is also to schedule pelvic US to assess lining for sometime next week. Called patient back with this information. She will call by 4pm today to schedule, or call back Monday morning to schedule pelvic US. Patient will let Herminia know at appt on 6/21 if bleeding has stopped.      ABDULKADIR THAYER on 6/14/24 at 3:22 PM.

## 2024-06-14 NOTE — TELEPHONE ENCOUNTER
Reason for call: Call Back  Notes: Pt would like to discuss her Norethindrone medication. She stated she's been bleeding excessively while on the medication. She started her cycle a couple of months ago and has not stopped bleeding since. She's scheduled for a virtual FUV on 6/21 with Herminia.

## 2024-06-21 ENCOUNTER — TELEMEDICINE (OUTPATIENT)
Dept: ENDOCRINOLOGY | Facility: CLINIC | Age: 49
End: 2024-06-21
Payer: COMMERCIAL

## 2024-06-21 VITALS — BODY MASS INDEX: 36.29 KG/M2 | HEIGHT: 59 IN | WEIGHT: 180 LBS

## 2024-06-21 DIAGNOSIS — N93.9 ABNORMAL UTERINE BLEEDING (AUB): Primary | ICD-10-CM

## 2024-06-21 PROCEDURE — 1036F TOBACCO NON-USER: CPT | Performed by: NURSE PRACTITIONER

## 2024-06-21 PROCEDURE — 3008F BODY MASS INDEX DOCD: CPT | Performed by: NURSE PRACTITIONER

## 2024-06-21 PROCEDURE — 99212 OFFICE O/P EST SF 10 MIN: CPT | Performed by: NURSE PRACTITIONER

## 2024-06-21 ASSESSMENT — PAIN SCALES - GENERAL: PAINLEVEL: 0-NO PAIN

## 2024-06-21 NOTE — PROGRESS NOTES
Subjective   Patient ID: Anastacio Mcknight is a 49 y.o. female with abnormal uterine bleeding.    HPI  Pt presented to Dr. Diallo 11/2023 for Fertility consultation.   AUB was discussed at that time, pt had work up for AUB with Dr. Leon and was prescribed Slynd which pt reported at that time she was not taking.   Dr. Diallo recommended pt go on Aygestin 5 mg PO daily for AUB   Pt misunderstand and began taking both Slynd and Aygestin  It was recently discovered by RN team that pt was taking both at which time pt was instructed to stop the Slynd and remain on Aygestin  Pt called in with heavy vaginal bleeding after stopping Slynd, Aygestin was increased to 10 mg PO daily and pt has been taking this x 1 week and bleeding has stopped    Pt denies lightheadedness/dizzines or SOB  Pt cannot proceed with Fertility treatments until DM is well controlled, pt is currently working with Endocrine on blood glucose management was recently started on Ozempic       Review of Systems    Objective   Physical Exam    Ultrasound from Nov 2023  FINDINGS:  UTERUS:  The uterus measures  9.2 x 4.9 x 5.8 cm in sagittal, AP, and  transverse dimensions.  Fibroid noted within the posterior myometrium  measuring up to 1.5 cm in size.      ENDOMETRIUM:  The central endometrial complex measures  7 mm in dual layer  thickness.  No fluid noted within the endometrial cavity.      RIGHT ADNEXA:  The right ovary is not identified on today's examination likely  secondary to shadowing from overlying bowel gas.      LEFT ADNEXA:  The left ovary is not identified on today's examination likely  secondary to shadowing from overlying bowel gas.      CUL DE SAC:  No significant free fluid.      IMPRESSION:  Endometrium measures 7 mm in dual layer thickness. If the patient is  experiencing postmenopausal bleeding, this is abnormal and requires  further evaluation to exclude an occult underlying neoplasm. With the  patient is not experiencing  postmenopausal bleeding, this is within  normal limits.    EMB results from 12/2023   ENDOMETRIUM, BIOPSY:   -- Inactive endometrium with stromal decidualization consistent with progestin effect      Assessment/Plan   49 year old female with long hx of AUB, now controlled on Aygestin.     Plan  -Recommend updated pelvic ultrasound to assess EM lining, pt has order for this needs to call our office to schedule  -Recommend pt continue Aygestin 10 mg PO daily through the weekend. If no additonal vaginal bleeding pt will reduce dose back down to 5 mg PO daily and remain on 5 mg PO daily   -Encouraged pt to continue well women care with Dr. Leon until her DM is well controlled and she is ready for conception attempts  -Pt aware to follow up once ready for conception attempts to discuss fertility testing/treatment.      Time

## 2024-07-02 DIAGNOSIS — E78.00 HYPERCHOLESTEROLEMIA: ICD-10-CM

## 2024-07-03 RX ORDER — ROSUVASTATIN CALCIUM 5 MG/1
5 TABLET, COATED ORAL DAILY
Qty: 90 TABLET | Refills: 1 | Status: SHIPPED | OUTPATIENT
Start: 2024-07-03

## 2024-07-25 ENCOUNTER — PATIENT MESSAGE (OUTPATIENT)
Dept: PRIMARY CARE | Facility: CLINIC | Age: 49
End: 2024-07-25
Payer: COMMERCIAL

## 2024-07-25 DIAGNOSIS — E11.9 TYPE 2 DIABETES MELLITUS WITHOUT COMPLICATION, WITHOUT LONG-TERM CURRENT USE OF INSULIN (MULTI): ICD-10-CM

## 2024-07-25 DIAGNOSIS — E78.00 HYPERCHOLESTEROLEMIA: ICD-10-CM

## 2024-07-25 DIAGNOSIS — L29.9 ITCH OF SKIN: ICD-10-CM

## 2024-07-25 DIAGNOSIS — E11.69 TYPE 2 DIABETES MELLITUS WITH OTHER SPECIFIED COMPLICATION, UNSPECIFIED WHETHER LONG TERM INSULIN USE (MULTI): ICD-10-CM

## 2024-07-25 DIAGNOSIS — E03.9 HYPOTHYROIDISM, UNSPECIFIED: ICD-10-CM

## 2024-07-25 RX ORDER — LEVOTHYROXINE SODIUM 175 UG/1
175 TABLET ORAL DAILY
Qty: 90 TABLET | Refills: 2 | Status: SHIPPED | OUTPATIENT
Start: 2024-07-25

## 2024-07-25 RX ORDER — SEMAGLUTIDE 0.68 MG/ML
0.5 INJECTION, SOLUTION SUBCUTANEOUS
Qty: 9 ML | Refills: 1 | Status: SHIPPED | OUTPATIENT
Start: 2024-07-25

## 2024-07-25 RX ORDER — METFORMIN HYDROCHLORIDE 500 MG/1
500 TABLET ORAL
Qty: 200 TABLET | Refills: 3 | Status: SHIPPED | OUTPATIENT
Start: 2024-07-25 | End: 2025-08-29

## 2024-07-25 RX ORDER — HYDROXYZINE HYDROCHLORIDE 25 MG/1
25 TABLET, FILM COATED ORAL DAILY
Qty: 90 TABLET | Refills: 0 | Status: SHIPPED | OUTPATIENT
Start: 2024-07-25

## 2024-07-25 RX ORDER — LEVOCETIRIZINE DIHYDROCHLORIDE 5 MG/1
5 TABLET, FILM COATED ORAL EVERY EVENING
Qty: 90 TABLET | Refills: 3 | Status: SHIPPED | OUTPATIENT
Start: 2024-07-25

## 2024-07-25 RX ORDER — ROSUVASTATIN CALCIUM 5 MG/1
5 TABLET, COATED ORAL DAILY
Qty: 90 TABLET | Refills: 1 | Status: SHIPPED | OUTPATIENT
Start: 2024-07-25

## 2024-07-27 DIAGNOSIS — N91.4 SECONDARY OLIGOMENORRHEA: ICD-10-CM

## 2024-07-27 RX ORDER — NORETHINDRONE 5 MG/1
5 TABLET ORAL DAILY
Qty: 30 TABLET | Refills: 0 | Status: SHIPPED | OUTPATIENT
Start: 2024-07-27 | End: 2025-07-27

## 2024-08-28 DIAGNOSIS — I10 HYPERTENSION, UNSPECIFIED TYPE: ICD-10-CM

## 2024-08-28 RX ORDER — HYDROCHLOROTHIAZIDE 25 MG/1
25 TABLET ORAL DAILY
Qty: 90 TABLET | Refills: 0 | Status: SHIPPED | OUTPATIENT
Start: 2024-08-28

## 2024-08-29 ENCOUNTER — TELEPHONE (OUTPATIENT)
Dept: ENDOCRINOLOGY | Facility: CLINIC | Age: 49
End: 2024-08-29
Payer: COMMERCIAL

## 2024-08-29 DIAGNOSIS — N91.4 SECONDARY OLIGOMENORRHEA: ICD-10-CM

## 2024-08-29 RX ORDER — NORETHINDRONE 5 MG/1
5 TABLET ORAL DAILY
Qty: 30 TABLET | Refills: 1 | Status: SHIPPED | OUTPATIENT
Start: 2024-08-29 | End: 2025-08-29

## 2024-08-29 NOTE — TELEPHONE ENCOUNTER
Returned patient's call.   This patient of Herminia Osman is 49 years old, ANDREA, and is hoping to discuss IVF with donor eggs in the future.   For now, she is tentatively scheduled for November for a virtual visit to discuss her long hxAUB with Dr. Diallo. She has been on Aygestin since 11/2023    She had EMB done on 12-: Inactive endometrium consistent with progestin effect, no abnormal cells changes noted.       She called back in June that she was heavily bleeding on Aygestin, but was taking SLYND simultaneously. She stopped the SLYND, increased her Aygestin to 10mg daily and was told to schedule pelvic US to assess lining.   This seemed to have controlled the bleeding for a while.     She called today to let us know she started bleeding again on 8/27, like a period while back on the 5mg Aygestin so she increased to 2 pills daily again. She stated she is changing pad Q3-4 hours and wondering if she can get refill of Aygestin since she will be running low next week, but also if there is anything else in the mean time she can do.     She is to repeat her pelvic US, the order is already placed.     Message sent to Providers to review during office hours to see if patient can be seen by NP sooner than November to discuss AUB, and when to schedule pelvic (if she can do at anytime and come in tomorrow or early next week).     Evie Markham 08/29/24 3:06 PM      Reviewed with Dr. Hadley during office hours - will plan to schedule VFUV with her in September and will complete pelvic US prior to this appt. Will send in order for refill of Aygestin. Patient will remain on 2 tablets daily until bleeding slows/stops then will go back to 1 tablet daily. Patient will monitor the bleeding and go to ED if she is changing pad every hour/having any lightheadedness or dizziness.  Transferred to  to schedule pelvic and virtual visit.     Evie Markham 08/29/24 3:52 PM

## 2024-08-29 NOTE — TELEPHONE ENCOUNTER
Reason for call:   Notes: Patient called to schedule a VFUV with Dr. Diallo, so she can get her medication refilled and next available appt was 11/7/24. She said she will out of meds by then.

## 2024-09-03 ENCOUNTER — ANCILLARY PROCEDURE (OUTPATIENT)
Dept: ENDOCRINOLOGY | Facility: CLINIC | Age: 49
End: 2024-09-03
Payer: COMMERCIAL

## 2024-09-03 DIAGNOSIS — Z31.41 FERTILITY TESTING: ICD-10-CM

## 2024-09-03 PROCEDURE — 76830 TRANSVAGINAL US NON-OB: CPT

## 2024-09-03 PROCEDURE — 76830 TRANSVAGINAL US NON-OB: CPT | Performed by: OBSTETRICS & GYNECOLOGY

## 2024-09-12 ENCOUNTER — APPOINTMENT (OUTPATIENT)
Dept: ENDOCRINOLOGY | Facility: CLINIC | Age: 49
End: 2024-09-12
Payer: COMMERCIAL

## 2024-09-16 ENCOUNTER — APPOINTMENT (OUTPATIENT)
Dept: ENDOCRINOLOGY | Facility: CLINIC | Age: 49
End: 2024-09-16
Payer: COMMERCIAL

## 2024-09-17 ENCOUNTER — OFFICE VISIT (OUTPATIENT)
Dept: ENDOCRINOLOGY | Facility: CLINIC | Age: 49
End: 2024-09-17
Payer: COMMERCIAL

## 2024-09-17 ENCOUNTER — TELEPHONE (OUTPATIENT)
Dept: PRIMARY CARE | Facility: CLINIC | Age: 49
End: 2024-09-17
Payer: COMMERCIAL

## 2024-09-17 VITALS
DIASTOLIC BLOOD PRESSURE: 70 MMHG | WEIGHT: 173 LBS | RESPIRATION RATE: 16 BRPM | HEIGHT: 59 IN | HEART RATE: 76 BPM | BODY MASS INDEX: 34.88 KG/M2 | SYSTOLIC BLOOD PRESSURE: 132 MMHG

## 2024-09-17 DIAGNOSIS — E03.9 HYPOTHYROIDISM, UNSPECIFIED TYPE: ICD-10-CM

## 2024-09-17 DIAGNOSIS — E78.5 HYPERLIPIDEMIA, UNSPECIFIED HYPERLIPIDEMIA TYPE: ICD-10-CM

## 2024-09-17 DIAGNOSIS — E11.9 TYPE 2 DIABETES MELLITUS WITHOUT COMPLICATION, WITHOUT LONG-TERM CURRENT USE OF INSULIN (MULTI): Primary | ICD-10-CM

## 2024-09-17 RX ORDER — LANCETS 33 GAUGE
1 EACH MISCELLANEOUS DAILY
Qty: 100 EACH | Refills: 3 | Status: SHIPPED | OUTPATIENT
Start: 2024-09-17 | End: 2025-09-17

## 2024-09-17 RX ORDER — BLOOD-GLUCOSE METER
1 EACH MISCELLANEOUS DAILY
Qty: 100 STRIP | Refills: 0 | Status: SHIPPED | OUTPATIENT
Start: 2024-09-17

## 2024-09-17 RX ORDER — DEXTROSE 4 G
1 TABLET,CHEWABLE ORAL DAILY
Qty: 1 EACH | Refills: 0 | Status: SHIPPED | OUTPATIENT
Start: 2024-09-17

## 2024-09-17 RX ORDER — BLOOD-GLUCOSE SENSOR
EACH MISCELLANEOUS
Qty: 9 EACH | Refills: 3 | Status: SHIPPED | OUTPATIENT
Start: 2024-09-17

## 2024-09-17 ASSESSMENT — ENCOUNTER SYMPTOMS
COUGH: 0
FATIGUE: 0
PALPITATIONS: 0
SHORTNESS OF BREATH: 0
VOMITING: 0
HEADACHES: 0
FEVER: 0
CHILLS: 0
DIARRHEA: 0
NAUSEA: 0

## 2024-09-17 NOTE — PROGRESS NOTES
Endocrinology: Follow up visit  Subjective   Patient ID: Anastacio Mcknight is a 49 y.o. female who presents for Hypothyroidism.    PCP: Jeremy Cabral MD    HPI  Dm2:  Metformin 1000 every day  Ozempic 0.5 (new)    Since last visit A1c was up at 9 so started ozempic.  Dexcom called in but didn't go through??   Changed jobs and now may change again.   Feeling well.  No issues with ozempic.  Working on eating. Down 7 lbs    Review of Systems   Constitutional:  Negative for chills, fatigue and fever.   Respiratory:  Negative for cough and shortness of breath.    Cardiovascular:  Negative for chest pain and palpitations.   Gastrointestinal:  Negative for diarrhea, nausea and vomiting.   Neurological:  Negative for headaches.       Patient Active Problem List   Diagnosis    Abnormal blood chemistry    Abnormal ultrasound of uterus    Abnormal menstruation    Adult situational stress disorder    Amenorrhea    Breakthrough bleeding on birth control pills    Blood pressure elevated without history of HTN    Cyst of ovary, left    Delayed surgical wound healing    Functional dyspepsia    History of uterine fibroid    Graves' ophthalmopathy    Hyperlipidemia    Hypertension    Hypothyroidism    Iron deficiency anemia    Irregular menses    Irritable bowel syndrome with diarrhea    URI with cough and congestion    Obesity, Class II, BMI 35-39.9    Restless legs    S/P saul    Sleep disturbance    Status post breast reduction    Trigger finger of right thumb    Urinary frequency    Urinary symptom or sign    Female infertility    Vitamin D deficiency    Sinus congestion    Firmness of abdomen    Abnormality of right breast on screening mammogram    Abdominal pain    Abnormal uterine bleeding (AUB)        Home Meds:  Current Outpatient Medications   Medication Instructions    blood-glucose sensor (Dexcom G7 Sensor) device Change every 10 days    Dexcom G6 Sensor device APPLY 1 SENSOR EVERY 10 DAYS    Dexcom G6  "Transmitter device USE AS INSTRUCTED    ergocalciferol (VITAMIN D-2) 50,000 Units, oral, Once Weekly    hydroCHLOROthiazide (HYDRODIURIL) 25 mg, oral, Daily    hydrOXYzine HCL (ATARAX) 25 mg, oral, Daily    ibuprofen 800 mg, oral, Every 8 hours PRN    levocetirizine (XYZAL) 5 mg, oral, Every evening    levothyroxine (SYNTHROID, LEVOXYL) 175 mcg, oral, Daily    metFORMIN (GLUCOPHAGE) 500 mg, oral, 2 times daily (morning and late afternoon)    norethindrone (AYGESTIN) 5 mg, oral, Daily    Ozempic 0.5 mg, subcutaneous, Every 7 days    rosuvastatin (CRESTOR) 5 mg, oral, Daily        No Known Allergies     Objective   Vitals:    09/17/24 1107   BP: 132/70   Pulse: 76   Resp: 16      Vitals:    09/17/24 1107   Weight: 78.5 kg (173 lb)      Body mass index is 34.94 kg/m².   Physical Exam  Constitutional:       Appearance: Normal appearance. She is overweight.   HENT:      Head: Normocephalic and atraumatic.   Neck:      Thyroid: No thyroid mass, thyromegaly or thyroid tenderness.   Cardiovascular:      Rate and Rhythm: Normal rate and regular rhythm.      Heart sounds: No murmur heard.     No gallop.   Pulmonary:      Effort: Pulmonary effort is normal.      Breath sounds: Normal breath sounds.   Abdominal:      Palpations: Abdomen is soft.      Comments: benign   Neurological:      General: No focal deficit present.      Mental Status: She is alert and oriented to person, place, and time.      Deep Tendon Reflexes: Reflexes are normal and symmetric.   Psychiatric:         Behavior: Behavior is cooperative.         Labs:  Lab Results   Component Value Date    HGBA1C 9.5 (H) 05/28/2024    TSH 0.55 01/08/2024    FREET4 1.71 (H) 08/17/2023      No results found for: \"PR1\", \"THYROIDPAB\", \"TSI\"     Assessment/Plan   Assessment & Plan  Type 2 diabetes mellitus without complication, without long-term current use of insulin (Multi)  Labs today  Will call in dexcom and regular meter  Discussed increase in ozempic if not " significantly improved  Orders:    CBC; Future    Comprehensive Metabolic Panel; Future    Hemoglobin A1C; Future    Albumin-Creatinine Ratio, Urine Random; Future    Hyperlipidemia, unspecified hyperlipidemia type    On statin, recheck next fasting labs  Hypothyroidism, unspecified type  Recheck with weight change  Orders:    TSH with reflex to Free T4 if abnormal; Future        Electronically signed by:  Brenna Pinon MD 09/17/24 11:37 AM

## 2024-09-18 ENCOUNTER — LAB (OUTPATIENT)
Dept: LAB | Facility: LAB | Age: 49
End: 2024-09-18
Payer: COMMERCIAL

## 2024-09-18 DIAGNOSIS — E78.00 HYPERCHOLESTEROLEMIA: ICD-10-CM

## 2024-09-18 DIAGNOSIS — E11.9 TYPE 2 DIABETES MELLITUS WITHOUT COMPLICATION, WITHOUT LONG-TERM CURRENT USE OF INSULIN (MULTI): Primary | ICD-10-CM

## 2024-09-18 DIAGNOSIS — E11.9 TYPE 2 DIABETES MELLITUS WITHOUT COMPLICATION, WITHOUT LONG-TERM CURRENT USE OF INSULIN (MULTI): ICD-10-CM

## 2024-09-18 DIAGNOSIS — E03.9 HYPOTHYROIDISM, UNSPECIFIED TYPE: ICD-10-CM

## 2024-09-18 DIAGNOSIS — E11.9 TYPE 2 DIABETES MELLITUS WITHOUT COMPLICATION, UNSPECIFIED WHETHER LONG TERM INSULIN USE (MULTI): ICD-10-CM

## 2024-09-18 LAB
ALBUMIN SERPL BCP-MCNC: 3.7 G/DL (ref 3.4–5)
ALP SERPL-CCNC: 58 U/L (ref 33–110)
ALT SERPL W P-5'-P-CCNC: 10 U/L (ref 7–45)
ANION GAP SERPL CALC-SCNC: 14 MMOL/L (ref 10–20)
AST SERPL W P-5'-P-CCNC: 12 U/L (ref 9–39)
BILIRUB SERPL-MCNC: 0.3 MG/DL (ref 0–1.2)
BUN SERPL-MCNC: 11 MG/DL (ref 6–23)
CALCIUM SERPL-MCNC: 9 MG/DL (ref 8.6–10.6)
CHLORIDE SERPL-SCNC: 104 MMOL/L (ref 98–107)
CHOLEST SERPL-MCNC: 225 MG/DL (ref 0–199)
CHOLESTEROL/HDL RATIO: 8.1
CO2 SERPL-SCNC: 23 MMOL/L (ref 21–32)
CREAT SERPL-MCNC: 1.08 MG/DL (ref 0.5–1.05)
CREAT UR-MCNC: 185.1 MG/DL (ref 20–320)
EGFRCR SERPLBLD CKD-EPI 2021: 63 ML/MIN/1.73M*2
ERYTHROCYTE [DISTWIDTH] IN BLOOD BY AUTOMATED COUNT: 16.5 % (ref 11.5–14.5)
EST. AVERAGE GLUCOSE BLD GHB EST-MCNC: 206 MG/DL
GLUCOSE SERPL-MCNC: 241 MG/DL (ref 74–99)
HBA1C MFR BLD: 8.8 %
HCT VFR BLD AUTO: 36.4 % (ref 36–46)
HDLC SERPL-MCNC: 27.7 MG/DL
HGB BLD-MCNC: 11.2 G/DL (ref 12–16)
LDLC SERPL CALC-MCNC: 157 MG/DL
MCH RBC QN AUTO: 24.1 PG (ref 26–34)
MCHC RBC AUTO-ENTMCNC: 30.8 G/DL (ref 32–36)
MCV RBC AUTO: 78 FL (ref 80–100)
MICROALBUMIN UR-MCNC: 284.5 MG/L
MICROALBUMIN/CREAT UR: 153.7 UG/MG CREAT
NON HDL CHOLESTEROL: 197 MG/DL (ref 0–149)
NRBC BLD-RTO: 0 /100 WBCS (ref 0–0)
PLATELET # BLD AUTO: 398 X10*3/UL (ref 150–450)
POTASSIUM SERPL-SCNC: 4.3 MMOL/L (ref 3.5–5.3)
PROT SERPL-MCNC: 7 G/DL (ref 6.4–8.2)
RBC # BLD AUTO: 4.64 X10*6/UL (ref 4–5.2)
SODIUM SERPL-SCNC: 137 MMOL/L (ref 136–145)
T4 FREE SERPL-MCNC: 1.91 NG/DL (ref 0.78–1.48)
TRIGL SERPL-MCNC: 204 MG/DL (ref 0–149)
TSH SERPL-ACNC: 0.32 MIU/L (ref 0.44–3.98)
VLDL: 41 MG/DL (ref 0–40)
WBC # BLD AUTO: 6 X10*3/UL (ref 4.4–11.3)

## 2024-09-18 PROCEDURE — 85027 COMPLETE CBC AUTOMATED: CPT

## 2024-09-18 PROCEDURE — 36415 COLL VENOUS BLD VENIPUNCTURE: CPT

## 2024-09-18 PROCEDURE — 83036 HEMOGLOBIN GLYCOSYLATED A1C: CPT

## 2024-09-18 PROCEDURE — 84439 ASSAY OF FREE THYROXINE: CPT

## 2024-09-18 PROCEDURE — 84443 ASSAY THYROID STIM HORMONE: CPT

## 2024-09-18 RX ORDER — SEMAGLUTIDE 1.34 MG/ML
1 INJECTION, SOLUTION SUBCUTANEOUS
Qty: 9 ML | Refills: 3 | Status: SHIPPED | OUTPATIENT
Start: 2024-09-18 | End: 2025-09-18

## 2024-09-24 NOTE — PROGRESS NOTES
Virtual or Telephone Consent: An interactive audio and video telecommunication system which permits real time communications between the patient (at the originating site) and provider (at the distant site) was utilized to provide this telehealth service    Follow Up Visit HPI    Patient is a 49 y.o.  female with long history of  AUB  presenting today for follow up visit. She has been medically managed with Aygestin.    She had EMB done on 2023: Inactive endometrium consistent with progestin effect, no abnormal cells changes noted.     She called back in  that she was heavily bleeding on Aygestin, but was taking SLYND simultaneously. She stopped the SLYND, increased her Aygestin to 10mg daily and was told to schedule pelvic US to assess lining. This seemed to have controlled the bleeding for a while so she decreased to 5mg. She called in August reporting she started bleeding again on , like a period while back on the 5mg Aygestin so she increased to 2 pills daily again. She stated she was changing pad Q3-4 hours, and then with increase dose it improved. Yesterday she went to provide a urine sample for a new job and reported it was all full of blood.    In terms of fertility, patient previously was hoping to discuss IVF with donor eggs, but now feels ready to close the chapter on future fertility. Her daughter just had a baby 5 days ago and she is focusing on caring for her granddaughter. She also reports she was recently diagnosed with T2DM so also does not think it would be safe or healthy for her to carry a pregnancy.     GYN Pelvic Ultrasound: US PELVIS TRANSVAGINAL (9/3/2024):   Impression: Scattered small myomas with measurements in report. Exam limited by maternal habitus. The ovaries are subvisualized but appear normal. 3D reconstruction of the uterus  is limited but no apparent anomalies.   Endometrial thickness, total 6.3 mm  Fibroids: Fibroids identified  Uterine fibroid mean 14.7  mm; Posterior. Intramural  Uterine fibroid mean 14.3 mm; Anterior. Subserous  Uterine fibroid mean 15.3 mm; Posterior. Intramural     Latest Reference Range & Units Most Recent   FOLLICLE STIMULATING HORMONE IU/L 8.3  1/8/24 10:34   Hemoglobin A1C See comment % 8.8 (H)  9/18/24 12:08   HCG, Beta-Quantitative IU/L <3  8/5/20 13:17   Thyroxine, Free 0.78 - 1.48 ng/dL 1.91 (H)  9/18/24 12:08   LH IU/L 1.6  1/8/24 10:34   Progesterone ng/mL <0.3  1/8/24 13:51   PROLACTIN 3.0 - 20.0 ug/L 4.5  1/8/24 10:34   Thyroid Stimulating Hormone 0.44 - 3.98 mIU/L 0.32 (L)  9/18/24 12:08   Vitamin D, 25-Hydroxy, Total ng/mL 7 !  7/15/20 15:43   Estradiol pg/mL <19  1/8/24 13:51   Triiodothyronine, Free 2.3 - 4.2 pg/mL 3.1  9/28/20 11:56   T3, Reverse 9.0 - 27.0 ng/dL 19.6  6/23/18 09:40   Testosterone, Free 0.1 - 6.4 pg/mL 1.5  1/8/24 10:34   GLUCOSE 74 - 99 mg/dL 241 (H)  9/18/24 12:08   Estimated Average Glucose Not Established mg/dL 206  9/18/24 12:08   Anti-Mullerian Hormone <=0.064 ng/mL <0.003  1/8/24 10:34   Testosterone, Total, LC-MS/MS 2 - 45 ng/dL 7  1/8/24 10:34   (H): Data is abnormally high  (L): Data is abnormally low  !: Data is abnormal    Past Medical History:   Diagnosis Date    Adjustment disorder, unspecified 11/22/2022    Adult situational stress disorder    Essential (primary) hypertension 11/22/2022    Hypertension    Hypertrophy of breast 01/17/2019    Large breasts    Hypothyroidism, unspecified 12/29/2021    Hypothyroidism    Irregular menstruation, unspecified 06/20/2014    Missed period    Other conditions influencing health status 06/20/2014    History of pregnancy    Personal history of diseases of the blood and blood-forming organs and certain disorders involving the immune mechanism     History of anemia    Personal history of other infectious and parasitic diseases 02/04/2019    History of surgical site infection    Personal history of other specified conditions 05/13/2016    History of fatigue     Personal history of other specified conditions 2021    History of dizziness    Sleep disorder, unspecified 2022    Sleep disturbance     Past Surgical History:   Procedure Laterality Date     SECTION, CLASSIC  2017     Section    GALLBLADDER SURGERY  2017    Gallbladder Surgery    OTHER SURGICAL HISTORY  2021    Laparoscopy    OTHER SURGICAL HISTORY  2019    Breast reduction    OTHER SURGICAL HISTORY  2019    Dilation and curettage    OTHER SURGICAL HISTORY  2019    Spencer tooth extraction    TONSILLECTOMY  2017    Tonsillectomy     Current Outpatient Medications on File Prior to Visit   Medication Sig Dispense Refill    blood sugar diagnostic (OneTouch Verio test strips) strip 1 strip once daily. 100 strip 0    blood-glucose meter (OneTouch Verio Flex meter) misc 1 kit once daily. 1 each 0    blood-glucose sensor (Dexcom G7 Sensor) device Change every 10 days 9 each 3    ergocalciferol (Vitamin D-2) 1.25 MG (34007 UT) capsule Take 1 capsule (50,000 Units) by mouth 1 (one) time per week.      hydroCHLOROthiazide (HYDRODiuril) 25 mg tablet TAKE 1 TABLET BY MOUTH EVERY DAY 90 tablet 0    hydrOXYzine HCL (Atarax) 25 mg tablet Take 1 tablet (25 mg) by mouth once daily. 90 tablet 0    ibuprofen 800 mg tablet Take 1 tablet (800 mg) by mouth every 8 hours if needed (pain).      lancets (OneTouch Delica Plus Lancet) 33 gauge misc 1 Lancet once daily. 100 each 3    levocetirizine (Xyzal) 5 mg tablet Take 1 tablet (5 mg) by mouth once daily in the evening. 90 tablet 3    levothyroxine (Synthroid, Levoxyl) 175 mcg tablet Take 1 tablet (175 mcg) by mouth once daily. 90 tablet 2    metFORMIN (Glucophage) 500 mg tablet Take 1 tablet (500 mg) by mouth 2 times daily (morning and late afternoon). 200 tablet 3    norethindrone (Aygestin) 5 mg tablet Take 1 tablet (5 mg) by mouth once daily. 30 tablet 1    rosuvastatin (Crestor) 5 mg tablet Take 1 tablet (5 mg) by  mouth once daily. 90 tablet 1    semaglutide (Ozempic) 0.25 mg or 0.5 mg (2 mg/3 mL) pen injector Inject 0.5 mg under the skin every 7 days. 9 mL 1    semaglutide (Ozempic) 1 mg/dose (4 mg/3 mL) pen injector Inject 1 mg under the skin every 7 days. 9 mL 3     No current facility-administered medications on file prior to visit.       BMI:   BMI Readings from Last 1 Encounters:   24 34.94 kg/m²     VITALS:  There were no vitals taken for this visit.  LMP: No LMP recorded.    ASSESSMENT   49 y.o.  female with long history of AUB and new diagnosis of T2DM. Patient no longer desiring pregnancy via IVF with donor eggs.     COUNSELING  Counseled on management options of AUB, including medical and surgical. Reviewed definitive surgical management would be hysterectomy; advised would maintain ovaries. Also briefly discussed endometrial ablation, although advised that we would not perform this procedure and she could follow up with GYN. Lastly discussed continued medical management with Aygestin, increasing dose to 10 mg. Reviewed risks/benefits of all options. Patient would like to discuss with her ; she is considering hysterectomy but would like to try medical management until the end of this year. All questions answered.    PLAN  Refill for Aygestin 10 mg daily sent to patient pharmacy  Patient to reach out for follow up visit if indicated or if desires surgical management with our practice    Maggie Hadley  2024  3:36 PM

## 2024-09-25 ENCOUNTER — TELEMEDICINE (OUTPATIENT)
Dept: ENDOCRINOLOGY | Facility: CLINIC | Age: 49
End: 2024-09-25

## 2024-09-25 DIAGNOSIS — N91.4 SECONDARY OLIGOMENORRHEA: ICD-10-CM

## 2024-09-25 DIAGNOSIS — N93.9 ABNORMAL UTERINE BLEEDING: Primary | ICD-10-CM

## 2024-09-25 PROCEDURE — 99214 OFFICE O/P EST MOD 30 MIN: CPT | Performed by: STUDENT IN AN ORGANIZED HEALTH CARE EDUCATION/TRAINING PROGRAM

## 2024-09-25 RX ORDER — NORETHINDRONE 5 MG/1
5 TABLET ORAL DAILY
Qty: 30 TABLET | Refills: 3 | Status: SHIPPED | OUTPATIENT
Start: 2024-09-25 | End: 2025-09-25

## 2024-10-08 ENCOUNTER — APPOINTMENT (OUTPATIENT)
Dept: PRIMARY CARE | Facility: CLINIC | Age: 49
End: 2024-10-08
Payer: COMMERCIAL

## 2024-11-05 ENCOUNTER — APPOINTMENT (OUTPATIENT)
Dept: PRIMARY CARE | Facility: CLINIC | Age: 49
End: 2024-11-05

## 2024-11-07 ENCOUNTER — APPOINTMENT (OUTPATIENT)
Dept: ENDOCRINOLOGY | Facility: CLINIC | Age: 49
End: 2024-11-07
Payer: COMMERCIAL

## 2024-11-13 ENCOUNTER — APPOINTMENT (OUTPATIENT)
Dept: PRIMARY CARE | Facility: CLINIC | Age: 49
End: 2024-11-13
Payer: COMMERCIAL

## 2024-11-13 VITALS — SYSTOLIC BLOOD PRESSURE: 128 MMHG | DIASTOLIC BLOOD PRESSURE: 78 MMHG | BODY MASS INDEX: 34.34 KG/M2 | WEIGHT: 170 LBS

## 2024-11-13 DIAGNOSIS — L29.9 ITCH OF SKIN: ICD-10-CM

## 2024-11-13 PROCEDURE — 3074F SYST BP LT 130 MM HG: CPT | Performed by: INTERNAL MEDICINE

## 2024-11-13 PROCEDURE — 3078F DIAST BP <80 MM HG: CPT | Performed by: INTERNAL MEDICINE

## 2024-11-13 PROCEDURE — 99214 OFFICE O/P EST MOD 30 MIN: CPT | Performed by: INTERNAL MEDICINE

## 2024-11-13 RX ORDER — HYDROXYZINE HYDROCHLORIDE 25 MG/1
25 TABLET, FILM COATED ORAL DAILY
Qty: 90 TABLET | Refills: 1 | Status: SHIPPED | OUTPATIENT
Start: 2024-11-13

## 2024-11-13 NOTE — PROGRESS NOTES
Subjective   Patient ID: Anastacio Mcknight is a 49 y.o. female who presents for Follow-up.    HPI  Patient in for a visit  Just got her insurance so it covered her dexcom   Also gave her weigh scale   Sometimes up at night helping her dtr with the 8 week old baby granddtr   Also adjusting her thyroid   Blood pressure is good   Creatinine  , does drink protein smoothies not daily     Review of Systems  General: Denies fever, chills, night sweats,  Eyes: Negative for recent visual changes  Ears, Nose, Throat :  Negative for hearing changes, sinus discomfort  Dermatologic: Negative for new skin conditions, rash  Respiratory: Negative for wheezing, shortness of breath, cough  Cardiovascular: Negative for chest pain, palpitations, or leg swelling  Gastrointestinal: Negative for nausea/vomiting, abdominal pain, changes in bowel habits  Genitourinary Negative for Urinary Incontinence  urgency , frequency, discomfort   Musculoskeletal: see hpi  Neurological: Negative for headaches, dizziness    Previous history  Past Medical History:   Diagnosis Date   • Adjustment disorder, unspecified 11/22/2022    Adult situational stress disorder   • Essential (primary) hypertension 11/22/2022    Hypertension   • Hypertrophy of breast 01/17/2019    Large breasts   • Hypothyroidism, unspecified 12/29/2021    Hypothyroidism   • Irregular menstruation, unspecified 06/20/2014    Missed period   • Other conditions influencing health status 06/20/2014    History of pregnancy   • Personal history of diseases of the blood and blood-forming organs and certain disorders involving the immune mechanism     History of anemia   • Personal history of other infectious and parasitic diseases 02/04/2019    History of surgical site infection   • Personal history of other specified conditions 05/13/2016    History of fatigue   • Personal history of other specified conditions 12/07/2021    History of dizziness   • Sleep disorder, unspecified 01/14/2022     Sleep disturbance     Past Surgical History:   Procedure Laterality Date   •  SECTION, CLASSIC  2017     Section   • GALLBLADDER SURGERY  2017    Gallbladder Surgery   • OTHER SURGICAL HISTORY  2021    Laparoscopy   • OTHER SURGICAL HISTORY  2019    Breast reduction   • OTHER SURGICAL HISTORY  2019    Dilation and curettage   • OTHER SURGICAL HISTORY  2019    [place] tooth extraction   • TONSILLECTOMY  2017    Tonsillectomy     Social History     Tobacco Use   • Smoking status: Never   • Smokeless tobacco: Never   Vaping Use   • Vaping status: Never Used   Substance Use Topics   • Alcohol use: Yes     Alcohol/week: 2.0 standard drinks of alcohol     Types: 1 Glasses of wine, 1 Standard drinks or equivalent per week     Comment: 3 drinks/month   • Drug use: Never     Family History   Problem Relation Name Age of Onset   • Diabetes Father     • Cancer Father     • Other (cardiac disorder) Brother     • Brain cancer Mother's Brother     • Breast cancer Maternal Grandmother     • Diabetes Maternal Grandfather     • Heart failure Other grandmother    • Cancer Other grandfather    • Cancer Other uncle    • Colon cancer Maternal Great-Grandfather       No Known Allergies  Current Outpatient Medications   Medication Instructions   • blood sugar diagnostic (OneTouch Verio test strips) strip 1 strip, miscellaneous, Daily   • blood-glucose meter (OneTouch Verio Flex meter) misc 1 kit, miscellaneous, Daily   • blood-glucose sensor (Dexcom G7 Sensor) device Change every 10 days   • ergocalciferol (VITAMIN D-2) 50,000 Units, Once Weekly   • hydroCHLOROthiazide (HYDRODIURIL) 25 mg, oral, Daily   • hydrOXYzine HCL (ATARAX) 25 mg, oral, Daily   • ibuprofen 800 mg, Every 8 hours PRN   • lancets (OneTouch Delica Plus Lancet) 33 gauge misc 1 Lancet, miscellaneous, Daily   • levocetirizine (XYZAL) 5 mg, oral, Every evening   • levothyroxine (SYNTHROID, LEVOXYL) 175 mcg, oral,  Daily   • metFORMIN (GLUCOPHAGE) 500 mg, oral, 2 times daily (morning and late afternoon)   • norethindrone (AYGESTIN) 5 mg, oral, Daily   • Ozempic 0.5 mg, subcutaneous, Every 7 days   • Ozempic 1 mg, subcutaneous, Every 7 days   • rosuvastatin (CRESTOR) 5 mg, oral, Daily       Objective       Physical Exam    Vital Signs: as recorded above  General: Well groomed, well nourished  with her dtr and granddtr with consent   Orientation:  Alert , oriented to time, place , and person   Mood and Affect:  Cooperative , no apparent distress normal affect  Skin: Good color, good turgor  Eyes: Extra ocular muscle movements intact, anicteric sclerae  Neck: Supple, full range of movement  Chest: Normal breath sounds, normal chest wall exam, symmetric, good air entry, clear to auscultation  Heart: Regular rate and rhythm, without murmur, gallop, or rubs  BACK:  no CTLS spine tenderness, no flank tenderness  Extremities: full range of movement  bilateral UE and bilateral LE,  no lower extremity edema  Neurological: Alert, oriented, cranial nerves II-XII grossly intact except for visual acuity  Sensation:  Intact   Gait: normal steady    Assessment/Plan   Anastacio Mcknight is a 49 y.o. female who presents for the concerns below:    Problem List Items Addressed This Visit    None           Discussed with:   Return in :  6 months   Portions of this note were generated using digital voice recognition software, and may contain grammatical errors       Jeremy Cabral MD  11/13/24  2:04 PM

## 2024-11-22 DIAGNOSIS — E78.00 HYPERCHOLESTEROLEMIA: ICD-10-CM

## 2024-11-22 RX ORDER — ROSUVASTATIN CALCIUM 5 MG/1
5 TABLET, COATED ORAL DAILY
Qty: 90 TABLET | Refills: 1 | Status: SHIPPED | OUTPATIENT
Start: 2024-11-22

## 2024-12-06 ENCOUNTER — TELEPHONE (OUTPATIENT)
Dept: ENDOCRINOLOGY | Facility: CLINIC | Age: 49
End: 2024-12-06
Payer: COMMERCIAL

## 2024-12-06 DIAGNOSIS — N91.4 SECONDARY OLIGOMENORRHEA: ICD-10-CM

## 2024-12-06 RX ORDER — NORETHINDRONE 5 MG/1
5 TABLET ORAL DAILY
Qty: 30 TABLET | Refills: 3 | Status: SHIPPED | OUTPATIENT
Start: 2024-12-06 | End: 2025-12-06

## 2024-12-11 DIAGNOSIS — J31.0 RHINITIS, UNSPECIFIED TYPE: Primary | ICD-10-CM

## 2024-12-11 RX ORDER — FLUTICASONE PROPIONATE 50 MCG
2 SPRAY, SUSPENSION (ML) NASAL DAILY
COMMUNITY

## 2024-12-13 RX ORDER — FLUTICASONE PROPIONATE 50 MCG
2 SPRAY, SUSPENSION (ML) NASAL DAILY
Qty: 48 ML | Refills: 1 | Status: SHIPPED | OUTPATIENT
Start: 2024-12-13

## 2025-01-20 ENCOUNTER — APPOINTMENT (OUTPATIENT)
Dept: ENDOCRINOLOGY | Facility: CLINIC | Age: 50
End: 2025-01-20
Payer: COMMERCIAL

## 2025-01-20 VITALS — WEIGHT: 161 LBS | BODY MASS INDEX: 32.52 KG/M2

## 2025-01-20 DIAGNOSIS — E78.5 HYPERLIPIDEMIA, UNSPECIFIED HYPERLIPIDEMIA TYPE: ICD-10-CM

## 2025-01-20 DIAGNOSIS — E11.9 TYPE 2 DIABETES MELLITUS WITHOUT COMPLICATION, WITHOUT LONG-TERM CURRENT USE OF INSULIN (MULTI): Primary | ICD-10-CM

## 2025-01-20 DIAGNOSIS — E03.9 HYPOTHYROIDISM, UNSPECIFIED TYPE: ICD-10-CM

## 2025-01-20 PROCEDURE — 99214 OFFICE O/P EST MOD 30 MIN: CPT | Performed by: INTERNAL MEDICINE

## 2025-01-20 PROCEDURE — 1036F TOBACCO NON-USER: CPT | Performed by: INTERNAL MEDICINE

## 2025-01-20 ASSESSMENT — ENCOUNTER SYMPTOMS
NAUSEA: 0
VOMITING: 0
CHILLS: 0
FATIGUE: 0
SHORTNESS OF BREATH: 0
COUGH: 0
HEADACHES: 0
PALPITATIONS: 0
FEVER: 0
DIARRHEA: 0

## 2025-01-20 NOTE — ASSESSMENT & PLAN NOTE
Discussed may need further adjust with weight change  Orders:    TSH with reflex to Free T4 if abnormal; Future

## 2025-01-20 NOTE — PATIENT INSTRUCTIONS
Fasting labs when able  Follow up in 6 months  Keep up efforts with eating and activity  Call or message with concerns

## 2025-01-21 ENCOUNTER — APPOINTMENT (OUTPATIENT)
Dept: ENDOCRINOLOGY | Facility: CLINIC | Age: 50
End: 2025-01-21
Payer: COMMERCIAL

## 2025-01-26 DIAGNOSIS — N97.9 FEMALE INFERTILITY: ICD-10-CM

## 2025-01-26 RX ORDER — NORETHINDRONE 5 MG/1
5 TABLET ORAL DAILY
Qty: 30 TABLET | Refills: 2 | Status: SHIPPED | OUTPATIENT
Start: 2025-01-26 | End: 2026-01-26

## 2025-02-12 DIAGNOSIS — I10 HYPERTENSION, UNSPECIFIED TYPE: ICD-10-CM

## 2025-02-12 RX ORDER — HYDROCHLOROTHIAZIDE 25 MG/1
25 TABLET ORAL DAILY
Qty: 90 TABLET | Refills: 0 | Status: SHIPPED | OUTPATIENT
Start: 2025-02-12

## 2025-03-08 LAB
ALBUMIN SERPL-MCNC: 3.8 G/DL (ref 3.6–5.1)
ALBUMIN/CREAT UR: 58 MG/G CREAT
ALP SERPL-CCNC: 63 U/L (ref 37–153)
ALT SERPL-CCNC: 9 U/L (ref 6–29)
ANION GAP SERPL CALCULATED.4IONS-SCNC: 10 MMOL/L (CALC) (ref 7–17)
AST SERPL-CCNC: 10 U/L (ref 10–35)
BILIRUB SERPL-MCNC: 0.3 MG/DL (ref 0.2–1.2)
BUN SERPL-MCNC: 9 MG/DL (ref 7–25)
CALCIUM SERPL-MCNC: 9.1 MG/DL (ref 8.6–10.4)
CHLORIDE SERPL-SCNC: 106 MMOL/L (ref 98–110)
CHOLEST SERPL-MCNC: 158 MG/DL
CHOLEST/HDLC SERPL: 5.6 (CALC)
CO2 SERPL-SCNC: 25 MMOL/L (ref 20–32)
CREAT SERPL-MCNC: 1.06 MG/DL (ref 0.5–1.03)
CREAT UR-MCNC: 203 MG/DL (ref 20–275)
EGFRCR SERPLBLD CKD-EPI 2021: 64 ML/MIN/1.73M2
ERYTHROCYTE [DISTWIDTH] IN BLOOD BY AUTOMATED COUNT: 15.1 % (ref 11–15)
EST. AVERAGE GLUCOSE BLD GHB EST-MCNC: 263 MG/DL
EST. AVERAGE GLUCOSE BLD GHB EST-SCNC: 14.6 MMOL/L
GLUCOSE SERPL-MCNC: 123 MG/DL (ref 65–99)
HBA1C MFR BLD: 10.8 % OF TOTAL HGB
HCT VFR BLD AUTO: 38.7 % (ref 35–45)
HDLC SERPL-MCNC: 28 MG/DL
HGB BLD-MCNC: 12.1 G/DL (ref 11.7–15.5)
LDLC SERPL CALC-MCNC: 108 MG/DL (CALC)
MCH RBC QN AUTO: 24.6 PG (ref 27–33)
MCHC RBC AUTO-ENTMCNC: 31.3 G/DL (ref 32–36)
MCV RBC AUTO: 78.8 FL (ref 80–100)
MICROALBUMIN UR-MCNC: 11.7 MG/DL
NONHDLC SERPL-MCNC: 130 MG/DL (CALC)
PLATELET # BLD AUTO: 401 THOUSAND/UL (ref 140–400)
PMV BLD REES-ECKER: 11.5 FL (ref 7.5–12.5)
POTASSIUM SERPL-SCNC: 4.5 MMOL/L (ref 3.5–5.3)
PROT SERPL-MCNC: 7.3 G/DL (ref 6.1–8.1)
RBC # BLD AUTO: 4.91 MILLION/UL (ref 3.8–5.1)
SODIUM SERPL-SCNC: 141 MMOL/L (ref 135–146)
TRIGL SERPL-MCNC: 110 MG/DL
TSH SERPL-ACNC: 0.97 MIU/L
WBC # BLD AUTO: 6.5 THOUSAND/UL (ref 3.8–10.8)

## 2025-03-09 DIAGNOSIS — E11.9 TYPE 2 DIABETES MELLITUS WITHOUT COMPLICATION, WITHOUT LONG-TERM CURRENT USE OF INSULIN (MULTI): Primary | ICD-10-CM

## 2025-03-14 DIAGNOSIS — E11.9 TYPE 2 DIABETES MELLITUS WITHOUT COMPLICATION, WITHOUT LONG-TERM CURRENT USE OF INSULIN (MULTI): Primary | ICD-10-CM

## 2025-03-14 RX ORDER — GLIPIZIDE 5 MG/1
5 TABLET ORAL
Qty: 180 TABLET | Refills: 1 | Status: SHIPPED | OUTPATIENT
Start: 2025-03-14 | End: 2026-03-14

## 2025-04-08 ENCOUNTER — APPOINTMENT (OUTPATIENT)
Dept: PRIMARY CARE | Facility: CLINIC | Age: 50
End: 2025-04-08

## 2025-04-08 VITALS
WEIGHT: 165 LBS | SYSTOLIC BLOOD PRESSURE: 125 MMHG | DIASTOLIC BLOOD PRESSURE: 79 MMHG | HEIGHT: 59 IN | BODY MASS INDEX: 33.26 KG/M2

## 2025-04-08 DIAGNOSIS — F43.29 STRESS AND ADJUSTMENT REACTION: ICD-10-CM

## 2025-04-08 DIAGNOSIS — E11.69 TYPE 2 DIABETES MELLITUS WITH OTHER SPECIFIED COMPLICATION, UNSPECIFIED WHETHER LONG TERM INSULIN USE (MULTI): ICD-10-CM

## 2025-04-08 DIAGNOSIS — G47.00 INSOMNIA, UNSPECIFIED TYPE: Primary | ICD-10-CM

## 2025-04-08 PROCEDURE — 99214 OFFICE O/P EST MOD 30 MIN: CPT | Performed by: INTERNAL MEDICINE

## 2025-04-08 PROCEDURE — 1036F TOBACCO NON-USER: CPT | Performed by: INTERNAL MEDICINE

## 2025-04-08 PROCEDURE — 3078F DIAST BP <80 MM HG: CPT | Performed by: INTERNAL MEDICINE

## 2025-04-08 PROCEDURE — 3008F BODY MASS INDEX DOCD: CPT | Performed by: INTERNAL MEDICINE

## 2025-04-08 PROCEDURE — 3074F SYST BP LT 130 MM HG: CPT | Performed by: INTERNAL MEDICINE

## 2025-04-08 RX ORDER — TRAZODONE HYDROCHLORIDE 50 MG/1
50 TABLET ORAL NIGHTLY PRN
Qty: 30 TABLET | Refills: 1 | Status: SHIPPED | OUTPATIENT
Start: 2025-04-08 | End: 2026-04-08

## 2025-04-08 ASSESSMENT — PATIENT HEALTH QUESTIONNAIRE - PHQ9
2. FEELING DOWN, DEPRESSED OR HOPELESS: SEVERAL DAYS
10. IF YOU CHECKED OFF ANY PROBLEMS, HOW DIFFICULT HAVE THESE PROBLEMS MADE IT FOR YOU TO DO YOUR WORK, TAKE CARE OF THINGS AT HOME, OR GET ALONG WITH OTHER PEOPLE: SOMEWHAT DIFFICULT
SUM OF ALL RESPONSES TO PHQ9 QUESTIONS 1 AND 2: 2
1. LITTLE INTEREST OR PLEASURE IN DOING THINGS: SEVERAL DAYS

## 2025-04-08 ASSESSMENT — LIFESTYLE VARIABLES: HOW OFTEN DO YOU HAVE A DRINK CONTAINING ALCOHOL: MONTHLY OR LESS

## 2025-04-08 NOTE — PROGRESS NOTES
Subjective   Patient ID: Anastacio Mcknight is a 49 y.o. female who presents for follow up new medication.    HPI  Patient in for a visit  Marital problems ,  would like marriage counselling something that happened 10 years ago when she was pregnant at the time had a miscarriage   She is crying a lot cannot sleep   Her hba1c is high 10.8  she is on ozempic for mounjaro has to meet her $4000 deductible     Review of Systems  General: Denies fever, chills, night sweats,  Eyes: Negative for recent visual changes  Ears, Nose, Throat :  Negative for hearing changes, sinus discomfort  Dermatologic: Negative for new skin conditions, rash  Respiratory: Negative for wheezing, shortness of breath, cough  Cardiovascular: Negative for chest pain, palpitations, or leg swelling  Gastrointestinal: Negative for nausea/vomiting, abdominal pain, changes in bowel habits  Genitourinary Negative for Urinary Incontinence  urgency , frequency, discomfort   Musculoskeletal: see hpi  Neurological: Negative for headaches, dizziness    Previous history  Past Medical History:   Diagnosis Date    Adjustment disorder, unspecified 11/22/2022    Adult situational stress disorder    Essential (primary) hypertension 11/22/2022    Hypertension    Hypertrophy of breast 01/17/2019    Large breasts    Hypothyroidism, unspecified 12/29/2021    Hypothyroidism    Irregular menstruation, unspecified 06/20/2014    Missed period    Other conditions influencing health status 06/20/2014    History of pregnancy    Personal history of diseases of the blood and blood-forming organs and certain disorders involving the immune mechanism     History of anemia    Personal history of other infectious and parasitic diseases 02/04/2019    History of surgical site infection    Personal history of other specified conditions 05/13/2016    History of fatigue    Personal history of other specified conditions 12/07/2021    History of dizziness    Sleep disorder, unspecified  2022    Sleep disturbance     Past Surgical History:   Procedure Laterality Date     SECTION, CLASSIC  2017     Section    GALLBLADDER SURGERY  2017    Gallbladder Surgery    OTHER SURGICAL HISTORY  2021    Laparoscopy    OTHER SURGICAL HISTORY  2019    Breast reduction    OTHER SURGICAL HISTORY  2019    Dilation and curettage    OTHER SURGICAL HISTORY  2019    [place] tooth extraction    TONSILLECTOMY  2017    Tonsillectomy     Social History     Tobacco Use    Smoking status: Never     Passive exposure: Past    Smokeless tobacco: Never   Vaping Use    Vaping status: Never Used   Substance Use Topics    Alcohol use: Yes     Alcohol/week: 2.0 standard drinks of alcohol     Types: 1 Glasses of wine, 1 Standard drinks or equivalent per week     Comment: 3 drinks/month    Drug use: Never     Family History   Problem Relation Name Age of Onset    Diabetes Father      Cancer Father      Other (cardiac disorder) Brother      Brain cancer Mother's Brother      Breast cancer Maternal Grandmother      Diabetes Maternal Grandfather      Heart failure Other grandmother     Cancer Other grandfather     Cancer Other uncle     Colon cancer Maternal Great-Grandfather       No Known Allergies  Current Outpatient Medications   Medication Instructions    blood sugar diagnostic (OneTouch Verio test strips) strip 1 strip, miscellaneous, Daily    blood-glucose meter (OneTouch Verio Flex meter) misc 1 kit, miscellaneous, Daily    blood-glucose sensor (Dexcom G7 Sensor) device Change every 10 days    ergocalciferol (VITAMIN D-2) 50,000 Units, Once Weekly    fluticasone (Flonase) 50 mcg/actuation nasal spray 2 sprays, Each Nostril, Daily    fluticasone (Flonase) 50 mcg/actuation nasal spray 2 sprays, Daily    glipiZIDE (GLUCOTROL) 5 mg, oral, 2 times daily before meals    hydroCHLOROthiazide (HYDRODIURIL) 25 mg, oral, Daily    hydrOXYzine HCL (ATARAX) 25 mg, oral, Daily     ibuprofen 800 mg, Every 8 hours PRN    lancets (OneTouch Delica Plus Lancet) 33 gauge misc 1 Lancet, miscellaneous, Daily    levocetirizine (XYZAL) 5 mg, oral, Every evening    levothyroxine (SYNTHROID, LEVOXYL) 175 mcg, oral, Daily    metFORMIN (GLUCOPHAGE) 500 mg, oral, 2 times daily (morning and late afternoon)    norethindrone (AYGESTIN) 5 mg, oral, Daily    Ozempic 0.5 mg, subcutaneous, Every 7 days    Ozempic 1 mg, subcutaneous, Every 7 days    rosuvastatin (CRESTOR) 5 mg, oral, Daily    semaglutide 2 mg, subcutaneous, Once Weekly       Objective       Physical Exam  Vital Signs: as recorded above  General: Well groomed, well nourished   Orientation:  Alert , oriented to time, place , and person   Mood and Affect:  Cooperative , no apparent distress normal affect  Skin: Good color, good turgor  Eyes: Extra ocular muscle movements intact, anicteric sclerae  Neck: Supple, full range of movement  Chest: Normal breath sounds, normal chest wall exam, symmetric, good air entry, clear to auscultation  Heart: Regular rate and rhythm, without murmur, gallop, or rubs  BACK:  no CTLS spine tenderness, no flank tenderness  Extremities: full range of movement  bilateral UE and bilateral LE,  no lower extremity edema  Neurological: Alert, oriented, cranial nerves II-XII grossly intact except for visual acuity  Sensation:  Intact   Gait: normal steady      Assessment/Plan   Anastacio Mcknight is a 49 y.o. female who presents for the concerns below:    Problem List Items Addressed This Visit    None  STRESS MANAGEMENT:PLAN:  Patient symptoms stable with current medication, will update refills.  No adverse side effects. Follow-up visit in 4-6 months , sooner in 2 months if we are revising dosage. Rest/sleep hygiene, exercise for stress relief  No thoughts of hurting self or others. Follow-up as planned.    DIABETES MELLITUS PLAN: hihg hbaq1c stress will also  with current medication regimen  Follow Diabetic diet, will consult  nutrition if needed,  exercise as discussed, weight control., before our next visit will obtain HbA1c, BMP, urine microalbumin, ophthalmology exam.  Need Podiatry checks periodically.    HYPERCHOLESTEROLEMIA PLAN: stable  continue current medications  Follow low cholesterol diet, encouraged high omega 3 fatty acid intake in diet, exercise, weight control. . Will Obtain AST/ALT, fasting lipid profile, creatine kinase  on statin if not done within past 3-6 months . Coenzyme Q10 200 mg a day if able to help increase HDL.  Switched to a mediterranean diet     Back in  hours are good   Son is graduating in a couple of weeks          Discussed with:   Return in :  In may as scheduled  Portions of this note were generated using digital voice recognition software, and may contain grammatical errors       Jeremy Cabral MD  04/08/25  2:47 PM

## 2025-04-15 DIAGNOSIS — N97.9 FEMALE INFERTILITY: ICD-10-CM

## 2025-04-16 RX ORDER — NORETHINDRONE 5 MG/1
5 TABLET ORAL DAILY
Qty: 30 TABLET | Refills: 2 | Status: SHIPPED | OUTPATIENT
Start: 2025-04-16 | End: 2026-04-16

## 2025-04-23 DIAGNOSIS — E11.69 TYPE 2 DIABETES MELLITUS WITH OTHER SPECIFIED COMPLICATION, UNSPECIFIED WHETHER LONG TERM INSULIN USE (MULTI): ICD-10-CM

## 2025-04-26 ENCOUNTER — HOSPITAL ENCOUNTER (EMERGENCY)
Facility: HOSPITAL | Age: 50
Discharge: HOME | End: 2025-04-26
Attending: EMERGENCY MEDICINE
Payer: COMMERCIAL

## 2025-04-26 ENCOUNTER — APPOINTMENT (OUTPATIENT)
Dept: RADIOLOGY | Facility: HOSPITAL | Age: 50
End: 2025-04-26
Payer: COMMERCIAL

## 2025-04-26 VITALS
WEIGHT: 160 LBS | BODY MASS INDEX: 32.25 KG/M2 | SYSTOLIC BLOOD PRESSURE: 138 MMHG | DIASTOLIC BLOOD PRESSURE: 82 MMHG | HEART RATE: 76 BPM | TEMPERATURE: 98.2 F | HEIGHT: 59 IN | RESPIRATION RATE: 16 BRPM | OXYGEN SATURATION: 100 %

## 2025-04-26 DIAGNOSIS — S60.221A CONTUSION OF RIGHT HAND, INITIAL ENCOUNTER: Primary | ICD-10-CM

## 2025-04-26 PROCEDURE — 99284 EMERGENCY DEPT VISIT MOD MDM: CPT | Performed by: EMERGENCY MEDICINE

## 2025-04-26 PROCEDURE — 73110 X-RAY EXAM OF WRIST: CPT | Mod: RT

## 2025-04-26 PROCEDURE — 73130 X-RAY EXAM OF HAND: CPT | Mod: RT

## 2025-04-26 PROCEDURE — 73130 X-RAY EXAM OF HAND: CPT | Mod: RIGHT SIDE | Performed by: RADIOLOGY

## 2025-04-26 PROCEDURE — 73110 X-RAY EXAM OF WRIST: CPT | Mod: RIGHT SIDE | Performed by: RADIOLOGY

## 2025-04-26 ASSESSMENT — COLUMBIA-SUICIDE SEVERITY RATING SCALE - C-SSRS
1. IN THE PAST MONTH, HAVE YOU WISHED YOU WERE DEAD OR WISHED YOU COULD GO TO SLEEP AND NOT WAKE UP?: NO
6. HAVE YOU EVER DONE ANYTHING, STARTED TO DO ANYTHING, OR PREPARED TO DO ANYTHING TO END YOUR LIFE?: NO
2. HAVE YOU ACTUALLY HAD ANY THOUGHTS OF KILLING YOURSELF?: NO

## 2025-04-26 ASSESSMENT — PAIN DESCRIPTION - PAIN TYPE
TYPE: ACUTE PAIN
TYPE: ACUTE PAIN

## 2025-04-26 ASSESSMENT — PAIN DESCRIPTION - LOCATION
LOCATION: HAND
LOCATION: HAND

## 2025-04-26 ASSESSMENT — PAIN DESCRIPTION - DIRECTION: RADIATING_TOWARDS: WRIST/HAND

## 2025-04-26 ASSESSMENT — PAIN DESCRIPTION - ORIENTATION: ORIENTATION: RIGHT

## 2025-04-26 ASSESSMENT — PAIN SCALES - GENERAL
PAINLEVEL_OUTOF10: 4
PAINLEVEL_OUTOF10: 5 - MODERATE PAIN

## 2025-04-26 ASSESSMENT — PAIN - FUNCTIONAL ASSESSMENT
PAIN_FUNCTIONAL_ASSESSMENT: 0-10
PAIN_FUNCTIONAL_ASSESSMENT: 0-10

## 2025-04-26 NOTE — ED TRIAGE NOTES
Patient was swinging her hand and hit her hand or wrist on a speaker. Swelling and bruising noted to hand.

## 2025-04-26 NOTE — ED PROVIDER NOTES
HPI   Chief Complaint   Patient presents with    Hand Injury     Wrist and hand pain from hitting wrist or hand on a speaker       Patient was at a musical gathering when she accidentally hit her right hand on the speaker.  The hand is sore and swollen over the palmar aspect, extending into the thenar eminence and into the wrist.            Patient History   Medical History[1]  Surgical History[2]  Family History[3]  Social History[4]    Physical Exam   ED Triage Vitals [04/26/25 0330]   Temperature Heart Rate Respirations BP   36.7 °C (98.1 °F) 78 14 141/88      Pulse Ox Temp Source Heart Rate Source Patient Position   100 % Oral Monitor Sitting      BP Location FiO2 (%)     Left arm --       Physical Exam  Vitals and nursing note reviewed.   HENT:      Head: Normocephalic and atraumatic.      Right Ear: Tympanic membrane and ear canal normal.      Left Ear: Tympanic membrane and ear canal normal.      Nose: Nose normal.      Mouth/Throat:      Mouth: Mucous membranes are moist.   Cardiovascular:      Rate and Rhythm: Normal rate.   Pulmonary:      Effort: Pulmonary effort is normal.      Breath sounds: Normal breath sounds.   Abdominal:      General: Abdomen is flat.      Palpations: Abdomen is soft.   Musculoskeletal:         General: Normal range of motion.      Cervical back: Normal range of motion.   Skin:     General: Skin is warm and dry.      Findings: Bruising present.      Comments: Plantar surface of right hand is contused, swollen, slightly bruised.   Neurological:      General: No focal deficit present.      Mental Status: She is alert.           ED Course & MDM   Diagnoses as of 04/26/25 0447   Contusion of right hand, initial encounter                 No data recorded                                 Medical Decision Making  Patient's films of hand and wrist were negative for fracture.  There was obviously some soft tissue swelling over the thenar eminence.        Procedure  Procedures       Gary GONZALEZ  MD Ju  25 0403       [1]   Past Medical History:  Diagnosis Date    Adjustment disorder, unspecified 2022    Adult situational stress disorder    Essential (primary) hypertension 2022    Hypertension    Hypertrophy of breast 2019    Large breasts    Hypothyroidism, unspecified 2021    Hypothyroidism    Irregular menstruation, unspecified 2014    Missed period    Other conditions influencing health status 2014    History of pregnancy    Personal history of diseases of the blood and blood-forming organs and certain disorders involving the immune mechanism     History of anemia    Personal history of other infectious and parasitic diseases 2019    History of surgical site infection    Personal history of other specified conditions 2016    History of fatigue    Personal history of other specified conditions 2021    History of dizziness    Sleep disorder, unspecified 2022    Sleep disturbance   [2]   Past Surgical History:  Procedure Laterality Date     SECTION, CLASSIC  2017     Section    GALLBLADDER SURGERY  2017    Gallbladder Surgery    OTHER SURGICAL HISTORY  2021    Laparoscopy    OTHER SURGICAL HISTORY  2019    Breast reduction    OTHER SURGICAL HISTORY  2019    Dilation and curettage    OTHER SURGICAL HISTORY  2019    [place] tooth extraction    TONSILLECTOMY  2017    Tonsillectomy   [3]   Family History  Problem Relation Name Age of Onset    Diabetes Father      Cancer Father      Other (cardiac disorder) Brother      Brain cancer Mother's Brother      Breast cancer Maternal Grandmother      Diabetes Maternal Grandfather      Colon cancer Maternal Great-Grandfather      Heart failure Other grandmother     Cancer Other grandfather     Cancer Other uncle    [4]   Social History  Tobacco Use    Smoking status: Never     Passive exposure: Past    Smokeless tobacco: Never   Vaping Use     Vaping status: Never Used   Substance Use Topics    Alcohol use: Yes     Alcohol/week: 2.0 standard drinks of alcohol     Types: 1 Glasses of wine, 1 Standard drinks or equivalent per week     Comment: 3 drinks/month    Drug use: Never        Gary Dodd MD  04/26/25 044

## 2025-04-30 DIAGNOSIS — M79.641 HAND PAIN, RIGHT: ICD-10-CM

## 2025-05-01 RX ORDER — IBUPROFEN 800 MG/1
800 TABLET ORAL
Qty: 30 TABLET | Refills: 0 | Status: SHIPPED | OUTPATIENT
Start: 2025-05-01 | End: 2025-05-31

## 2025-05-05 ASSESSMENT — DERMATOLOGY QUALITY OF LIFE (QOL) ASSESSMENT
RATE HOW BOTHERED YOU ARE BY EFFECTS OF YOUR SKIN PROBLEMS ON YOUR ACTIVITIES (EG, GOING OUT, ACCOMPLISHING WHAT YOU WANT, WORK ACTIVITIES OR YOUR RELATIONSHIPS WITH OTHERS): 2
RATE HOW BOTHERED YOU ARE BY SYMPTOMS OF YOUR SKIN PROBLEM (EG, ITCHING, STINGING BURNING, HURTING OR SKIN IRRITATION): 3
WHAT SINGLE SKIN CONDITION LISTED BELOW IS THE PATIENT ANSWERING THE QUALITY-OF-LIFE ASSESSMENT QUESTIONS ABOUT: NONE OF THE ABOVE
RATE HOW BOTHERED YOU ARE BY EFFECTS OF YOUR SKIN PROBLEMS ON YOUR ACTIVITIES (EG, GOING OUT, ACCOMPLISHING WHAT YOU WANT, WORK ACTIVITIES OR YOUR RELATIONSHIPS WITH OTHERS): 2
RATE HOW EMOTIONALLY BOTHERED YOU ARE BY YOUR SKIN PROBLEM (FOR EXAMPLE, WORRY, EMBARRASSMENT, FRUSTRATION): 3
RATE HOW BOTHERED YOU ARE BY SYMPTOMS OF YOUR SKIN PROBLEM (EG, ITCHING, STINGING BURNING, HURTING OR SKIN IRRITATION): 3
WHAT SINGLE SKIN CONDITION LISTED BELOW IS THE PATIENT ANSWERING THE QUALITY-OF-LIFE ASSESSMENT QUESTIONS ABOUT: NONE OF THE ABOVE
ARE THERE EXCLUSIONS OR EXCEPTIONS FOR THE QUALITY OF LIFE ASSESSMENT: NO
RATE HOW EMOTIONALLY BOTHERED YOU ARE BY YOUR SKIN PROBLEM (FOR EXAMPLE, WORRY, EMBARRASSMENT, FRUSTRATION): 3

## 2025-05-06 ENCOUNTER — OFFICE VISIT (OUTPATIENT)
Dept: DERMATOLOGY | Facility: CLINIC | Age: 50
End: 2025-05-06
Payer: COMMERCIAL

## 2025-05-06 DIAGNOSIS — D22.5 MELANOCYTIC NEVUS OF TRUNK: ICD-10-CM

## 2025-05-06 DIAGNOSIS — L73.9 FOLLICULITIS: ICD-10-CM

## 2025-05-06 DIAGNOSIS — L57.8 DIFFUSE PHOTODAMAGE OF SKIN: ICD-10-CM

## 2025-05-06 DIAGNOSIS — L21.9 SEBORRHEIC DERMATITIS: Primary | ICD-10-CM

## 2025-05-06 PROCEDURE — 99204 OFFICE O/P NEW MOD 45 MIN: CPT | Performed by: DERMATOLOGY

## 2025-05-06 RX ORDER — KETOCONAZOLE 20 MG/G
CREAM TOPICAL
Qty: 60 G | Refills: 11 | Status: SHIPPED | OUTPATIENT
Start: 2025-05-06

## 2025-05-06 RX ORDER — CLINDAMYCIN PHOSPHATE 10 UG/ML
LOTION TOPICAL 2 TIMES DAILY
Qty: 60 ML | Refills: 11 | Status: SHIPPED | OUTPATIENT
Start: 2025-05-06 | End: 2026-05-06

## 2025-05-06 RX ORDER — KETOCONAZOLE 20 MG/ML
SHAMPOO, SUSPENSION TOPICAL
Qty: 120 ML | Refills: 11 | Status: SHIPPED | OUTPATIENT
Start: 2025-05-06

## 2025-05-06 ASSESSMENT — ITCH NUMERIC RATING SCALE: HOW SEVERE IS YOUR ITCHING?: 0

## 2025-05-06 NOTE — Clinical Note
Scattered on the patient's chest, there are several follicular-based erythematous, inflammatory papules and pustules

## 2025-05-06 NOTE — Clinical Note
Seborrheic Dermatitis -scalp and face.  The potentially chronic and intermittently flaring nature of this condition and treatment options were discussed extensively with the patient today.  At this time, for her scalp, I recommend topical anti-fungal therapy with Ketoconazole 2% shampoo, which the patient was instructed to use 2-3 days per week, alternating with over-the-counter anti-dandruff shampoos, such as Head & Shoulders, Selsun Blue, and Neutrogena T-gel, every month.  In addition, for her face, I recommend topical anti-fungal therapy with Ketoconazole 2% cream, which the patient was instructed to apply twice daily to the affected areas of the face.  The risks, benefits, and side effects of these medications were discussed.  The patient expressed understanding and is in agreement with this plan.

## 2025-05-06 NOTE — PROGRESS NOTES
Jacqueline Mcknight is a 49 y.o. female who presents for the following: Skin Check and Rash.  She states she has not noticed any changes in any of her brown spots recently, including in size, shape, or color, and they are all asymptomatic with no associated bleeding, itching, or pain.  She also notes a dry, flaky scalp and dry patches on her face, especially between her eyebrows and around her nose.  Lastly, she notes recent pimple breakouts on her chest.  She denies any other new, changing, or concerning skin lesions; no bleeding, itching, or burning lesions.      Review of Systems:  No other skin or systemic complaints other than what is documented elsewhere in the note.    The following portions of the chart were reviewed this encounter and updated as appropriate:       Skin Cancer History  Biopsy Log Book  No skin cancers from Specimen Tracking.    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    Current Medications / CAM's:  Current Medications[1]     Objective   Well appearing patient in no apparent distress; mood and affect are within normal limits.    A waist-up examination was performed including scalp, face, eyes, ears, nose, lips, neck, chest, axillae, abdomen, back, and bilateral upper extremities. All findings within normal limits unless otherwise noted below.        Assessment/Plan   Skin Exam  1. SEBORRHEIC DERMATITIS  Head - Anterior (Face)  On the patient's scalp and face,  mainly the glabella and bilateral eyebrows and perinasal creases, there are pink, scaly patches with whitish-yellowish, greasy scale  Seborrheic Dermatitis -scalp and face.  The potentially chronic and intermittently flaring nature of this condition and treatment options were discussed extensively with the patient today.  At this time, for her scalp, I recommend topical anti-fungal therapy with Ketoconazole 2% shampoo, which the patient was instructed to use 2-3 days per week, alternating with over-the-counter anti-dandruff shampoos, such as Head & Shoulders, Selsun Blue, and Neutrogena T-gel, every month.  In addition, for her face, I recommend topical anti-fungal therapy with Ketoconazole 2% cream, which the patient was instructed to apply twice daily to the affected areas of the face.  The risks, benefits, and side effects of these medications were discussed.  The patient expressed understanding and is in agreement with this plan.  ketoconazole (NIZOral) 2 % shampoo - Head - Anterior (Face)  Wash affected areas of scalp 2-3 times weekly as directed    ketoconazole (NIZOral) 2 % cream - Head - Anterior (Face)  Apply twice daily to affected areas of face  2. FOLLICULITIS  Left Breast  Scattered on the patient's chest, there are several follicular-based erythematous, inflammatory papules and pustules  Folliculitis -chest.  The bacterial nature of this condition and treatment options were discussed with the patient today.  At this time, I recommend topical antibiotic therapy with Clindamycin 1% lotion, which the patient was instructed to apply twice daily to the affected areas or up to 3-4 times per day as needed for active lesions.  The risks, benefits, and side effects of this medication were discussed.  The patient expressed understanding and is in agreement with this plan.  clindamycin (Cleocin T) 1 % lotion - Left Breast  Apply topically 2 times a day.  3. MELANOCYTIC NEVUS OF TRUNK  Generalized  Scattered on  the patient's face, neck, trunk, and bilateral upper extremities, there are several small, round- to oval-shaped, brown-pigmented and pink-colored, symmetric, uniform-appearing macules and dome-shaped papules  Clinically benign- to slightly atypical-appearing nevi - the clinically benign- to slightly atypical-appearing nature of the patient's nevi was discussed with the patient today.  None of the patient's nevi meet threshold for biopsy today.  I emphasized the importance of performing monthly self-skin exams using the ABCDs of monitoring moles, which were reviewed with the patient today and an informational hand-out provided.  I also emphasized the importance of sun avoidance and sun protection with daily sunscreen use.  The patient expressed understanding and is in agreement with this plan.  4. DIFFUSE PHOTODAMAGE OF SKIN  Photodistributed  Diffuse photodamage with actinic changes with telangiectasia and mottled pigmentation in sun-exposed areas.  Photodamage.  The signs and symptoms of skin cancer were reviewed and the patient was advised to practice sun protection and sun avoidance, use daily sunscreen, and perform regular self skin exams.  Sun protection was discussed, including avoiding the mid-day sun, wearing a sunscreen with SPF at least 50, and stressing the need for reapplication of sunscreen and applying more than they think they need.          [1]   Current Outpatient Medications:     blood sugar diagnostic (OneTouch Verio test strips) strip, 1 strip once daily., Disp: 100 strip, Rfl: 0    blood-glucose meter (OneTouch Verio Flex meter) misc, 1 kit once daily., Disp: 1 each, Rfl: 0    blood-glucose sensor (Dexcom G7 Sensor) device, Change every 10 days (Patient not taking: Reported on 1/20/2025), Disp: 9 each, Rfl: 3    clindamycin (Cleocin T) 1 % lotion, Apply topically 2 times a day., Disp: 60 mL, Rfl: 11    ergocalciferol (Vitamin D-2) 1.25 MG (51607 UT) capsule, Take 1 capsule (50,000 Units) by  mouth 1 (one) time per week., Disp: , Rfl:     fluticasone (Flonase) 50 mcg/actuation nasal spray, USE 2 SPRAYS IN EACH NOSTRIL EVERY DAY, Disp: 48 mL, Rfl: 1    fluticasone (Flonase) 50 mcg/actuation nasal spray, Administer 2 sprays into each nostril once daily. Shake gently. Before first use, prime pump. After use, clean tip and replace cap., Disp: , Rfl:     glipiZIDE (Glucotrol) 5 mg tablet, Take 1 tablet (5 mg) by mouth 2 times a day before meals., Disp: 180 tablet, Rfl: 1    hydroCHLOROthiazide (HYDRODiuril) 25 mg tablet, TAKE 1 TABLET BY MOUTH EVERY DAY, Disp: 90 tablet, Rfl: 0    hydrOXYzine HCL (Atarax) 25 mg tablet, Take 1 tablet (25 mg) by mouth once daily., Disp: 90 tablet, Rfl: 1    ibuprofen 800 mg tablet, Take 1 tablet (800 mg) by mouth every 24 (twenty four) hours if needed (pain). If not better let us know, Disp: 30 tablet, Rfl: 0    ketoconazole (NIZOral) 2 % cream, Apply twice daily to affected areas of face, Disp: 60 g, Rfl: 11    ketoconazole (NIZOral) 2 % shampoo, Wash affected areas of scalp 2-3 times weekly as directed, Disp: 120 mL, Rfl: 11    lancets (OneTouch Delica Plus Lancet) 33 gauge misc, 1 Lancet once daily., Disp: 100 each, Rfl: 3    levocetirizine (Xyzal) 5 mg tablet, Take 1 tablet (5 mg) by mouth once daily in the evening., Disp: 90 tablet, Rfl: 3    levothyroxine (Synthroid, Levoxyl) 175 mcg tablet, Take 1 tablet (175 mcg) by mouth once daily., Disp: 90 tablet, Rfl: 2    metFORMIN (Glucophage) 500 mg tablet, Take 1 tablet (500 mg) by mouth 2 times daily (morning and late afternoon)., Disp: 200 tablet, Rfl: 3    norethindrone (Aygestin) 5 mg tablet, Take 1 tablet (5 mg) by mouth once daily., Disp: 30 tablet, Rfl: 2    rosuvastatin (Crestor) 5 mg tablet, TAKE 1 TABLET BY MOUTH EVERY DAY, Disp: 90 tablet, Rfl: 1    semaglutide (Ozempic) 0.25 mg or 0.5 mg (2 mg/3 mL) pen injector, Inject 0.5 mg under the skin every 7 days. (Patient not taking: Reported on 1/20/2025), Disp: 9 mL, Rfl:  1    semaglutide (Ozempic) 1 mg/dose (4 mg/3 mL) pen injector, Inject 1 mg under the skin every 7 days., Disp: 9 mL, Rfl: 3    semaglutide 2 mg/dose (8 mg/3 mL) pen injector, Inject 2 mg under the skin 1 (one) time per week., Disp: 9 mL, Rfl: 1    traZODone (Desyrel) 50 mg tablet, Take 1 tablet (50 mg) by mouth as needed at bedtime for sleep., Disp: 30 tablet, Rfl: 1

## 2025-05-06 NOTE — Clinical Note
Folliculitis -chest.  The bacterial nature of this condition and treatment options were discussed with the patient today.  At this time, I recommend topical antibiotic therapy with Clindamycin 1% lotion, which the patient was instructed to apply twice daily to the affected areas or up to 3-4 times per day as needed for active lesions.  The risks, benefits, and side effects of this medication were discussed.  The patient expressed understanding and is in agreement with this plan.

## 2025-05-06 NOTE — Clinical Note
On the patient's scalp and face, mainly the glabella and bilateral eyebrows and perinasal creases, there are pink, scaly patches with whitish-yellowish, greasy scale

## 2025-05-08 ENCOUNTER — TELEMEDICINE (OUTPATIENT)
Dept: PHARMACY | Facility: HOSPITAL | Age: 50
End: 2025-05-08
Payer: COMMERCIAL

## 2025-05-08 DIAGNOSIS — E11.69 TYPE 2 DIABETES MELLITUS WITH OTHER SPECIFIED COMPLICATION, UNSPECIFIED WHETHER LONG TERM INSULIN USE (MULTI): ICD-10-CM

## 2025-05-08 RX ORDER — SEMAGLUTIDE 0.68 MG/ML
0.5 INJECTION, SOLUTION SUBCUTANEOUS
Qty: 3 ML | Refills: 0 | Status: SHIPPED | OUTPATIENT
Start: 2025-05-08

## 2025-05-08 NOTE — PROGRESS NOTES
Clinical Pharmacist Visit    Patient is sent at the request of Jeremy Cabral Am* for my opinion regarding Type 2 diabetes.  My final recommendations will be communicated back to the requesting provider by way of shared medical record.    Subjective     HPI    Past Medical History:  She has a past medical history of Adjustment disorder, unspecified (2022), Essential (primary) hypertension (2022), Hypertrophy of breast (2019), Hypothyroidism, unspecified (2021), Irregular menstruation, unspecified (2014), Other conditions influencing health status (2014), Personal history of diseases of the blood and blood-forming organs and certain disorders involving the immune mechanism, Personal history of other infectious and parasitic diseases (2019), Personal history of other specified conditions (2016), Personal history of other specified conditions (2021), and Sleep disorder, unspecified (2022).    Past Surgical History:  She has a past surgical history that includes Gallbladder surgery (2017);  section, classic (2017); Tonsillectomy (2017); Other surgical history (2021); Other surgical history (2019); Other surgical history (2019); and Other surgical history (2019).    Social History:  She reports that she has never smoked. She has been exposed to tobacco smoke. She has never used smokeless tobacco. She reports current alcohol use of about 2.0 standard drinks of alcohol per week. She reports that she does not use drugs.    Family History:  Family History[1]    Allergies:  Patient has no known allergies.    Current diet: well balanced  Current exercise: walking    Patient is using: glucometer  The patient is currently checking the blood glucose 2 times per day.    FBs    Hypoglycemia frequency: none  Hypoglycemia awareness: Yes     Adverse Effects:   None      Objective     Last Recorded Vitals:  BP  "Readings from Last 6 Encounters:   04/26/25 138/82   04/08/25 125/79   11/13/24 128/78   09/17/24 132/70   06/06/24 114/70   06/04/24 110/70        Wt Readings from Last 6 Encounters:   04/26/25 72.6 kg (160 lb)   04/08/25 74.8 kg (165 lb)   01/20/25 73 kg (161 lb)   11/13/24 77.1 kg (170 lb)   09/17/24 78.5 kg (173 lb)   06/21/24 81.6 kg (180 lb)       Diabetes Pharmacotherapy:  Current medications:  - Metformin 500 mg BID   - Glipizide 5 mg BID     Previous medications:   - Ozempic 1 mg weekly     Primary/Secondary Prevention   - Statin? Yes  - ACE-I/ARB? No  - Aspirin? No    Pertinent PMH Review:  - PMH of Pancreatitis: No  - PMH of Retinopathy: No  - PMH of Urinary Tract Infections: No  - PMH of MTC: No    Lab Review  Lab Results   Component Value Date    BILITOT 0.3 03/07/2025    CALCIUM 9.1 03/07/2025    CO2 25 03/07/2025     03/07/2025    CREATININE 1.06 (H) 03/07/2025    GLUCOSE 123 (H) 03/07/2025    ALKPHOS 63 03/07/2025    K 4.5 03/07/2025    PROT 7.3 03/07/2025     03/07/2025    AST 10 03/07/2025    ALT 9 03/07/2025    BUN 9 03/07/2025    ANIONGAP 10 03/07/2025    ALBUMIN 3.8 03/07/2025    LIPASE 15 11/21/2017    GFRF 72 08/17/2023     Lab Results   Component Value Date    TRIG 110 03/07/2025    CHOL 158 03/07/2025    LDLCALC 108 (H) 03/07/2025    HDL 28 (L) 03/07/2025     Lab Results   Component Value Date    HGBA1C 10.8 (H) 03/07/2025    HGBA1C 8.8 (H) 09/18/2024    HGBA1C 9.5 (H) 05/28/2024     No components found for: \"UACR\"  The 10-year ASCVD risk score (Olman DK, et al., 2019) is: 12.3%    Values used to calculate the score:      Age: 49 years      Sex: Female      Is Non- : Yes      Diabetic: Yes      Tobacco smoker: No      Systolic Blood Pressure: 138 mmHg      Is BP treated: No      HDL Cholesterol: 28 mg/dL      Total Cholesterol: 158 mg/dL    Health Maintenance:   Foot Exam: checks daily  Eye Exam: yearly   Lipid Panel:  on 3/7/2025  Urine Albumin: " elevated 3/7/2025 - recommend ACE/ARB + SGLT2i  Influenza Immunization: yearly  Pneumonia Immunization: Prevnar 20 recommended  Renal function: GFR 64 on 3/7/2025    Drug Interactions:  None requiring intervention      Patient Assistance Screening (VAF)  Patient verbally reports monthly or yearly income which is less than 400% federal poverty level  Application for program has been submitted for the following medications:   Ozempic   Patient aware this process may take up to 2 weeks once income documents have been sent to the team.  If approved, medication must be filled through [facility] pharmacy and may be picked up or mailed to patient.   If approved, medication will be billed through insurance, and patient assistance team will pay the copay. This will result in a $0 copay for the patient.  Counseled patient on mechanism of action, side effects, contraindications, and what to do if the patient misses a dose. All patients questions were answered.     Assessment/Plan   Problem List Items Addressed This Visit    None  Visit Diagnoses         Type 2 diabetes mellitus with other specified complication, unspecified whether long term insulin use (Multi)        Relevant Medications    semaglutide (Ozempic) 0.25 mg or 0.5 mg (2 mg/3 mL) pen injector    Other Relevant Orders    Referral to Clinical Pharmacy          Patient's diabetes is uncontrolled. She was previously closer to goal, however she had to stop taking her Ozempic due to cost. She was started on glipizide to help with blood sugar control until she can potentially restart Ozempic. Patient would benefit from restarting Ozempic to help with cardio, renal, and glycemic control. Will apply for patient assistance. Will titrate medications as tolerated to glycemic control. Can consider a dose increase of metformin if needed down the line. Will start with a lower dose of Ozempic since patient has been out for a few months.     Patient's UACR was elevated.  Recommend starting a low dose ACEi or ARB as blood pressure allows and also an SGLT2i once A1c comes down more to avoid side effects.     Patients diabetes is poorly controlled with most recent A1c of 10.6% on 3/7/2025(Goal < 7%).   Initiate:  Ozempic 0.5 mg weekly  Continue:   Metformin 500 mg BID   Glipizide 5 mg BID   Compliance at present is estimated to be good. Efforts to improve compliance (if necessary) will be directed at dietary modifications:  .  Education Provided to Patient:   Ozempic Education:  - Counseled patient on Ozempic MOA, expectations, administration, and monitoring parameters.   - Counseled patient that Ozempic may cause some GI adverse effects (upset stomach/diarrhea/constipation/nausea/vomiting) when starting out and ways to mitigate include eating smaller meals and limiting greasy or deep fried foods as this increases risk of nausea.   - Advised patient that they may experience improved satiety after meals and portion sizes of meals may be reduced as doses of Ozempic increase.   - Benefits of GLP1-ra in addition to glycemic control include cardioprotection, renal protection and weight loss.   - Reviewed Ozempic titration schedule, starting with 0.25 mg once weekly to a goal of 2 mg once weekly if tolerated.      Labs ordered: UTD  Follow-up: I recommend diabetes care be 1 month.  PCP Follow-Up: 5/13/2025    Juanita Sprague, PharmD  Clinical Pharmacy Specialist    Continue all meds under the continuation of care with the referring provider and clinical pharmacy team.         [1]   Family History  Problem Relation Name Age of Onset    Diabetes Father      Cancer Father      Other (cardiac disorder) Brother      Brain cancer Mother's Brother      Breast cancer Maternal Grandmother      Diabetes Maternal Grandfather      Colon cancer Maternal Great-Grandfather      Heart failure Other grandmother     Cancer Other grandfather     Cancer Other uncle

## 2025-05-13 ENCOUNTER — APPOINTMENT (OUTPATIENT)
Dept: PRIMARY CARE | Facility: CLINIC | Age: 50
End: 2025-05-13

## 2025-05-13 VITALS — WEIGHT: 160 LBS | BODY MASS INDEX: 32.32 KG/M2 | SYSTOLIC BLOOD PRESSURE: 136 MMHG | DIASTOLIC BLOOD PRESSURE: 82 MMHG

## 2025-05-13 DIAGNOSIS — G47.00 INSOMNIA, UNSPECIFIED TYPE: Primary | ICD-10-CM

## 2025-05-13 DIAGNOSIS — F43.29 STRESS AND ADJUSTMENT REACTION: ICD-10-CM

## 2025-05-13 DIAGNOSIS — R79.9 ABNORMAL BLOOD CHEMISTRY: ICD-10-CM

## 2025-05-13 DIAGNOSIS — E11.9 TYPE 2 DIABETES MELLITUS WITHOUT COMPLICATION, WITHOUT LONG-TERM CURRENT USE OF INSULIN: ICD-10-CM

## 2025-05-13 DIAGNOSIS — Z13.29 SCREENING FOR ENDOCRINE, METABOLIC AND IMMUNITY DISORDER: ICD-10-CM

## 2025-05-13 DIAGNOSIS — Z13.0 SCREENING FOR ENDOCRINE, METABOLIC AND IMMUNITY DISORDER: ICD-10-CM

## 2025-05-13 DIAGNOSIS — Z13.228 SCREENING FOR ENDOCRINE, METABOLIC AND IMMUNITY DISORDER: ICD-10-CM

## 2025-05-13 PROCEDURE — 3079F DIAST BP 80-89 MM HG: CPT | Performed by: INTERNAL MEDICINE

## 2025-05-13 PROCEDURE — 1036F TOBACCO NON-USER: CPT | Performed by: INTERNAL MEDICINE

## 2025-05-13 PROCEDURE — 99214 OFFICE O/P EST MOD 30 MIN: CPT | Performed by: INTERNAL MEDICINE

## 2025-05-13 PROCEDURE — 3075F SYST BP GE 130 - 139MM HG: CPT | Performed by: INTERNAL MEDICINE

## 2025-05-13 ASSESSMENT — PATIENT HEALTH QUESTIONNAIRE - PHQ9
1. LITTLE INTEREST OR PLEASURE IN DOING THINGS: NOT AT ALL
SUM OF ALL RESPONSES TO PHQ9 QUESTIONS 1 AND 2: 0
2. FEELING DOWN, DEPRESSED OR HOPELESS: NOT AT ALL
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
## 2025-05-31 DIAGNOSIS — F43.29 STRESS AND ADJUSTMENT REACTION: ICD-10-CM

## 2025-05-31 DIAGNOSIS — G47.00 INSOMNIA, UNSPECIFIED TYPE: ICD-10-CM

## 2025-06-02 RX ORDER — TRAZODONE HYDROCHLORIDE 50 MG/1
50 TABLET ORAL NIGHTLY PRN
Qty: 90 TABLET | Refills: 0 | Status: SHIPPED | OUTPATIENT
Start: 2025-06-02 | End: 2026-06-02

## 2025-06-04 DIAGNOSIS — E78.00 HYPERCHOLESTEROLEMIA: ICD-10-CM

## 2025-06-04 DIAGNOSIS — I10 HYPERTENSION, UNSPECIFIED TYPE: ICD-10-CM

## 2025-06-04 RX ORDER — ROSUVASTATIN CALCIUM 5 MG/1
5 TABLET, COATED ORAL DAILY
Qty: 30 TABLET | Refills: 5 | Status: SHIPPED | OUTPATIENT
Start: 2025-06-04

## 2025-06-05 ENCOUNTER — APPOINTMENT (OUTPATIENT)
Dept: PHARMACY | Facility: HOSPITAL | Age: 50
End: 2025-06-05
Payer: COMMERCIAL

## 2025-06-05 DIAGNOSIS — M79.641 HAND PAIN, RIGHT: ICD-10-CM

## 2025-06-05 RX ORDER — IBUPROFEN 800 MG/1
800 TABLET, FILM COATED ORAL EVERY 8 HOURS PRN
Qty: 30 TABLET | Refills: 0 | Status: SHIPPED | OUTPATIENT
Start: 2025-06-05

## 2025-06-09 RX ORDER — HYDROCHLOROTHIAZIDE 25 MG/1
25 TABLET ORAL DAILY
Qty: 90 TABLET | Refills: 0 | OUTPATIENT
Start: 2025-06-09

## 2025-06-13 ENCOUNTER — APPOINTMENT (OUTPATIENT)
Dept: PHARMACY | Facility: HOSPITAL | Age: 50
End: 2025-06-13
Payer: COMMERCIAL

## 2025-06-19 PROCEDURE — RXMED WILLOW AMBULATORY MEDICATION CHARGE

## 2025-06-20 ENCOUNTER — PHARMACY VISIT (OUTPATIENT)
Dept: PHARMACY | Facility: CLINIC | Age: 50
End: 2025-06-20
Payer: COMMERCIAL

## 2025-06-24 ENCOUNTER — APPOINTMENT (OUTPATIENT)
Dept: RADIOLOGY | Facility: CLINIC | Age: 50
End: 2025-06-24
Payer: COMMERCIAL

## 2025-06-24 DIAGNOSIS — Z12.31 SCREENING MAMMOGRAM FOR BREAST CANCER: ICD-10-CM

## 2025-06-27 ENCOUNTER — HOSPITAL ENCOUNTER (OUTPATIENT)
Dept: RADIOLOGY | Facility: CLINIC | Age: 50
Discharge: HOME | End: 2025-06-27
Payer: COMMERCIAL

## 2025-06-27 DIAGNOSIS — Z12.31 SCREENING MAMMOGRAM FOR BREAST CANCER: ICD-10-CM

## 2025-06-27 PROCEDURE — 77067 SCR MAMMO BI INCL CAD: CPT

## 2025-06-28 ENCOUNTER — OFFICE VISIT (OUTPATIENT)
Dept: URGENT CARE | Age: 50
End: 2025-06-28
Payer: COMMERCIAL

## 2025-06-28 VITALS
BODY MASS INDEX: 32.33 KG/M2 | OXYGEN SATURATION: 99 % | DIASTOLIC BLOOD PRESSURE: 80 MMHG | HEART RATE: 83 BPM | TEMPERATURE: 98 F | RESPIRATION RATE: 18 BRPM | SYSTOLIC BLOOD PRESSURE: 118 MMHG | WEIGHT: 160.05 LBS

## 2025-06-28 DIAGNOSIS — H00.025 HORDEOLUM INTERNUM OF LEFT LOWER EYELID: Primary | ICD-10-CM

## 2025-06-28 RX ORDER — ERYTHROMYCIN 5 MG/G
OINTMENT OPHTHALMIC EVERY 8 HOURS
Qty: 3.5 G | Refills: 0 | Status: SHIPPED | OUTPATIENT
Start: 2025-06-28 | End: 2025-07-08

## 2025-06-28 ASSESSMENT — PAIN SCALES - GENERAL: PAINLEVEL_OUTOF10: 3

## 2025-06-28 NOTE — PROGRESS NOTES
Subjective   Patient ID: Anastacio Mcknight is a 50 y.o. female. They present today with a chief complaint of LT EYE SWELLING (Left eye  itching this morning swelling started later. ).    History of Present Illness  Patient reports symptoms present for ~1 day  Notes itching and irritation of her left eye  Notes irritation under the left eye  Denies known injury  Possible slight watery discharge        Past Medical History  Allergies as of 06/28/2025    (No Known Allergies)       Prescriptions Prior to Admission[1]     Medical History[2]    Surgical History[3]     reports that she has never smoked. She has been exposed to tobacco smoke. She has never used smokeless tobacco. She reports current alcohol use of about 2.0 standard drinks of alcohol per week. She reports that she does not use drugs.                               Objective    Vitals:    06/28/25 1752   BP: 118/80   Pulse: 83   Resp: 18   Temp: 36.7 °C (98 °F)   SpO2: 99%   Weight: 72.6 kg (160 lb 0.9 oz)     No LMP recorded (lmp unknown). Patient is perimenopausal.    Physical Exam  Constitutional:       General: She is not in acute distress.     Appearance: Normal appearance. She is not toxic-appearing or diaphoretic.   HENT:      Nose: No rhinorrhea.   Eyes:      General: No scleral icterus.        Right eye: No discharge.         Left eye: Hordeolum present.No discharge.      Extraocular Movements: Extraocular movements intact.      Right eye: Normal extraocular motion.      Left eye: Normal extraocular motion.     Pulmonary:      Effort: Pulmonary effort is normal.   Musculoskeletal:      Cervical back: Normal range of motion.   Neurological:      Mental Status: She is alert.   Psychiatric:         Mood and Affect: Mood normal.         Behavior: Behavior normal.         Thought Content: Thought content normal.      Comments: Pleasant         Procedures    Point of Care Test & Imaging Results from this visit:      Diagnostic study results (if any) were  reviewed by Jacinto Guillermo MD.    Assessment/Plan   Allergies, medications, history, and pertinent labs/EKGs/Imaging reviewed by Jacinto Guillermo MD.     Medical Decision Making:    ***    Orders and Diagnoses  Diagnoses and all orders for this visit:  Hordeolum internum of left lower eyelid  -     erythromycin (Romycin) 5 mg/gram (0.5 %) ophthalmic ointment; Apply to left eye every 8 hours for 10 days. Apply Amount per Dose: 0.5 inch (~1 cm) per dose.      Patient disposition: { Disposition:16182}      Medical Admin Record      Follow Up Instructions  No follow-ups on file.    Electronically signed by Jacinto Guillermo MD  6:38 PM             [1] (Not in a hospital admission)   [2]   Past Medical History:  Diagnosis Date    Acne     Adjustment disorder, unspecified 2022    Adult situational stress disorder    Diabetes mellitus (Multi)     Disease of thyroid gland     Eczema     Essential (primary) hypertension 2022    Hypertension    Folliculitis     Hypertrophy of breast 2019    Large breasts    Hypothyroidism, unspecified 2021    Hypothyroidism    Irregular menstruation, unspecified 2014    Missed period    Other conditions influencing health status 2014    History of pregnancy    Personal history of diseases of the blood and blood-forming organs and certain disorders involving the immune mechanism     History of anemia    Personal history of other infectious and parasitic diseases 2019    History of surgical site infection    Personal history of other specified conditions 2016    History of fatigue    Personal history of other specified conditions 2021    History of dizziness    Sleep disorder, unspecified 2022    Sleep disturbance   [3]   Past Surgical History:  Procedure Laterality Date     SECTION, CLASSIC  2017     Section    GALLBLADDER SURGERY  2017    Gallbladder Surgery    OTHER SURGICAL HISTORY  2021     Laparoscopy    OTHER SURGICAL HISTORY  02/14/2019    Breast reduction    OTHER SURGICAL HISTORY  08/26/2019    Dilation and curettage    OTHER SURGICAL HISTORY  08/26/2019    [place] tooth extraction    REDUCTION MAMMAPLASTY  01/2017    TONSILLECTOMY  11/29/2017    Tonsillectomy

## 2025-06-28 NOTE — LETTER
June 28, 2025     Patient: Anastacio Mcknight   YOB: 1975   Date of Visit: 6/28/2025       To Whom It May Concern:    Anastacio Mcknight was seen in my clinic on 6/28/2025 at 5:30 pm. Please excuse Anastacio for her absence from work on this day until 7/1/2025.    If you have any questions or concerns, please don't hesitate to call.         Sincerely,         Jacinto Guillermo MD        CC: No Recipients

## 2025-07-06 ENCOUNTER — OFFICE VISIT (OUTPATIENT)
Dept: URGENT CARE | Age: 50
End: 2025-07-06
Payer: COMMERCIAL

## 2025-07-06 VITALS
WEIGHT: 160 LBS | TEMPERATURE: 98.6 F | RESPIRATION RATE: 16 BRPM | BODY MASS INDEX: 32.32 KG/M2 | HEART RATE: 73 BPM | DIASTOLIC BLOOD PRESSURE: 79 MMHG | SYSTOLIC BLOOD PRESSURE: 116 MMHG | OXYGEN SATURATION: 99 %

## 2025-07-06 DIAGNOSIS — H00.015 HORDEOLUM EXTERNUM LEFT LOWER EYELID: Primary | ICD-10-CM

## 2025-07-06 PROCEDURE — 3074F SYST BP LT 130 MM HG: CPT

## 2025-07-06 PROCEDURE — 1036F TOBACCO NON-USER: CPT

## 2025-07-06 PROCEDURE — 99213 OFFICE O/P EST LOW 20 MIN: CPT

## 2025-07-06 PROCEDURE — 3078F DIAST BP <80 MM HG: CPT

## 2025-07-06 ASSESSMENT — VISUAL ACUITY: OU: 1

## 2025-07-06 ASSESSMENT — ENCOUNTER SYMPTOMS: EYE ITCHING: 1

## 2025-07-06 NOTE — PROGRESS NOTES
Subjective   Patient ID: Anastacio Mcknight is a 50 y.o. female. They present today with a chief complaint of Eye Problem (Left low eyelid still not gone. ).    History of Present Illness  Patient is a 50-year-old female with history of hypertension, hyperlipidemia, hypothyroidism and Graves' ophthalmopathy who presents urgent care today with a complaint of ongoing left eyelid discomfort.  She was diagnosed with a stye on 6/28/2025.  She was provided with a prescription for erythromycin which she has been using as directed.  She notes her symptoms have improved significantly however she still has what she believes to be a stye in the corner of her bottom eyelid.  She denies any trauma, sensation of foreign body, headaches or any other symptoms.      History provided by:  Patient and spouse  Eye Problem  Associated symptoms: itching        Past Medical History  Allergies as of 07/06/2025    (No Known Allergies)       Prescriptions Prior to Admission[1]       Medical History[2]    Surgical History[3]     reports that she has never smoked. She has been exposed to tobacco smoke. She has never used smokeless tobacco. She reports current alcohol use of about 2.0 standard drinks of alcohol per week. She reports that she does not use drugs.    Review of Systems  Review of Systems   Eyes:  Positive for itching.                                  Objective    Vitals:    07/06/25 1732   BP: 116/79   BP Location: Right arm   Patient Position: Sitting   BP Cuff Size: Adult   Pulse: 73   Resp: 16   Temp: 37 °C (98.6 °F)   TempSrc: Oral   SpO2: 99%   Weight: 72.6 kg (160 lb)     No LMP recorded (lmp unknown). Patient is perimenopausal.    Physical Exam  Vitals and nursing note reviewed.   Constitutional:       General: She is not in acute distress.     Appearance: Normal appearance. She is not ill-appearing, toxic-appearing or diaphoretic.   HENT:      Head: Normocephalic and atraumatic.   Eyes:      General: Lids are everted, no  foreign bodies appreciated. Vision grossly intact. Gaze aligned appropriately. No allergic shiner, visual field deficit or scleral icterus.        Right eye: No discharge.         Left eye: Hordeolum present.No foreign body or discharge.      Extraocular Movements: Extraocular movements intact.      Left eye: Normal extraocular motion and no nystagmus.      Conjunctiva/sclera: Conjunctivae normal.      Left eye: Left conjunctiva is not injected. No chemosis, exudate or hemorrhage.     Pupils: Pupils are equal, round, and reactive to light.     Cardiovascular:      Rate and Rhythm: Normal rate and regular rhythm.      Pulses: Normal pulses.      Heart sounds: Normal heart sounds.   Pulmonary:      Effort: Pulmonary effort is normal. No respiratory distress.      Breath sounds: Normal breath sounds. No stridor. No wheezing, rhonchi or rales.   Chest:      Chest wall: No tenderness.   Skin:     General: Skin is warm and dry.      Capillary Refill: Capillary refill takes less than 2 seconds.   Neurological:      General: No focal deficit present.      Mental Status: She is alert and oriented to person, place, and time.   Psychiatric:         Mood and Affect: Mood normal.         Behavior: Behavior normal.         Procedures      Assessment/Plan   Allergies, medications, history, and pertinent labs/EKGs/Imaging reviewed by BAKARI Gutierrez.     Medical Decision Making    Patient is well appearing, afebrile, non toxic, not hypoxic, and appropriate for outpatient treatment and management at time of evaluation. Patient presents with ongoing left lower eyelid discomfort.     Differential includes but not limited to: Hordeolum, chalazion, conjunctivitis, other    On exam, patient has tenderness and what appears to be a stye located on the medial aspect of left lower eyelid as depicted above.  Pupil PERRLA.  Upper eyelid normal in appearance.  No periorbital edema.  No obvious foreign bodies.  No active drainage.   Patient denies visual changes or eye pain.    History exam consistent with hordeolum externum.  Recommend she continue using the erythromycin as previously prescribed as well as warm moist compresses 3-4 times a day.  She was provided with contact information for [facility] if her symptoms do not resolve.  She was given the opportunity ask questions.    Discussed return precautions and importance of follow-up. Advised to follow-up with PCP.  We spoke at length regarding the red flags he/she should be aware of and monitor for.  I specifically advised to go to the ED for changing or worsening symptoms, new symptoms, complaint specific precautions, and precautions listed on the discharge paperwork.    I advised the patient that the urgent care evaluation and treatment provided today doesn't end their need for medical care. It is very important that they follow-up with their primary care provider or other specialist as instructed.     The plan of care was mutually agreed upon with the patient. All questions and concerns were answered to the best of my ability with today's information.  Patient was discharged in stable condition.    Dictation software was used in the creation of this note which does not evaluate or correct for typographical, spelling, syntax or grammatical errors.    Orders and Diagnoses  There are no diagnoses linked to this encounter.    Medical Admin Record      Follow Up Instructions  No follow-ups on file.    Patient disposition: Home    Electronically signed by BAKARI Gutierrez  5:34 PM       [1] (Not in a hospital admission)  [2]   Past Medical History:  Diagnosis Date    Acne     Adjustment disorder, unspecified 11/22/2022    Adult situational stress disorder    Diabetes mellitus (Multi)     Disease of thyroid gland     Eczema     Essential (primary) hypertension 11/22/2022    Hypertension    Folliculitis     Hypertrophy of breast 01/17/2019    Large breasts    Hypothyroidism,  unspecified 2021    Hypothyroidism    Irregular menstruation, unspecified 2014    Missed period    Other conditions influencing health status 2014    History of pregnancy    Personal history of diseases of the blood and blood-forming organs and certain disorders involving the immune mechanism     History of anemia    Personal history of other infectious and parasitic diseases 2019    History of surgical site infection    Personal history of other specified conditions 2016    History of fatigue    Personal history of other specified conditions 2021    History of dizziness    Sleep disorder, unspecified 2022    Sleep disturbance   [3]   Past Surgical History:  Procedure Laterality Date     SECTION, CLASSIC  2017     Section    GALLBLADDER SURGERY  2017    Gallbladder Surgery    OTHER SURGICAL HISTORY  2021    Laparoscopy    OTHER SURGICAL HISTORY  2019    Breast reduction    OTHER SURGICAL HISTORY  2019    Dilation and curettage    OTHER SURGICAL HISTORY  2019    [place] tooth extraction    REDUCTION MAMMAPLASTY  2017    TONSILLECTOMY  2017    Tonsillectomy

## 2025-07-06 NOTE — PATIENT INSTRUCTIONS
You were seen at Urgent Care today for a stye. Please treat as discussed. Please take medications as prescribed. Monitor for red flags which we spoke about, If your symptoms change, worsen or become concerning in any way, please go to the emergency room immediately, otherwise you can followup with [facility] as discussed.

## 2025-07-08 DIAGNOSIS — N97.9 FEMALE INFERTILITY: ICD-10-CM

## 2025-07-08 RX ORDER — NORETHINDRONE 5 MG/1
5 TABLET ORAL DAILY
Qty: 30 TABLET | Refills: 2 | Status: SHIPPED | OUTPATIENT
Start: 2025-07-08 | End: 2026-07-08

## 2025-07-10 ENCOUNTER — APPOINTMENT (OUTPATIENT)
Dept: PHARMACY | Facility: HOSPITAL | Age: 50
End: 2025-07-10
Payer: COMMERCIAL

## 2025-07-10 DIAGNOSIS — E11.69 TYPE 2 DIABETES MELLITUS WITH OTHER SPECIFIED COMPLICATION, UNSPECIFIED WHETHER LONG TERM INSULIN USE (MULTI): ICD-10-CM

## 2025-07-10 RX ORDER — SEMAGLUTIDE 1.34 MG/ML
1 INJECTION, SOLUTION SUBCUTANEOUS
Qty: 3 ML | Refills: 1 | Status: SHIPPED | OUTPATIENT
Start: 2025-07-10

## 2025-07-10 NOTE — PROGRESS NOTES
Clinical Pharmacist Visit    Patient is sent at the request of Jeremy Cabral Am* for my opinion regarding Type 2 diabetes.  My final recommendations will be communicated back to the requesting provider by way of shared medical record.    Subjective     HPI    Past Medical History:  She has a past medical history of Acne, Adjustment disorder, unspecified (2022), Diabetes mellitus (Multi), Disease of thyroid gland, Eczema, Essential (primary) hypertension (2022), Folliculitis, Hypertrophy of breast (2019), Hypothyroidism, unspecified (2021), Irregular menstruation, unspecified (2014), Other conditions influencing health status (2014), Personal history of diseases of the blood and blood-forming organs and certain disorders involving the immune mechanism, Personal history of other infectious and parasitic diseases (2019), Personal history of other specified conditions (2016), Personal history of other specified conditions (2021), and Sleep disorder, unspecified (2022).    Past Surgical History:  She has a past surgical history that includes Gallbladder surgery (2017);  section, classic (2017); Tonsillectomy (2017); Other surgical history (2021); Other surgical history (2019); Other surgical history (2019); Other surgical history (2019); and Reduction mammaplasty (2017).    Social History:  She reports that she has never smoked. She has been exposed to tobacco smoke. She has never used smokeless tobacco. She reports current alcohol use of about 2.0 standard drinks of alcohol per week. She reports that she does not use drugs.    Family History:  Family History[1]    Allergies:  Patient has no known allergies.    Current diet: well balanced  Current exercise: walking    Patient is using: glucometer  The patient is currently checking the blood glucose 2 times per day.    FB, 82, 72     Hypoglycemia  "frequency: none  Hypoglycemia awareness: Yes     Adverse Effects:   None      Objective     Last Recorded Vitals:  BP Readings from Last 6 Encounters:   07/06/25 116/79   06/28/25 118/80   05/13/25 136/82   04/26/25 138/82   04/08/25 125/79   11/13/24 128/78        Wt Readings from Last 6 Encounters:   07/06/25 72.6 kg (160 lb)   06/28/25 72.6 kg (160 lb 0.9 oz)   05/13/25 72.6 kg (160 lb)   04/26/25 72.6 kg (160 lb)   04/08/25 74.8 kg (165 lb)   01/20/25 73 kg (161 lb)       Diabetes Pharmacotherapy:  Current medications:  - Metformin 500 mg BID   - Glipizide 5 mg BID   - Ozempic 0.5 mg weekly     Patient Assistance for Ozempic approved through 6/19/2025. Will have to be renewed prior to that date to prevent lapse in coverage. Medication(s) will be received at no cost to patient from [facility].      Previous medications:   - none     Primary/Secondary Prevention   - Statin? Yes  - ACE-I/ARB? No  - Aspirin? No    Pertinent PMH Review:  - PMH of Pancreatitis: No  - PMH of Retinopathy: No  - PMH of Urinary Tract Infections: No  - PMH of MTC: No    Lab Review  Lab Results   Component Value Date    BILITOT 0.3 03/07/2025    CALCIUM 9.1 03/07/2025    CO2 25 03/07/2025     03/07/2025    CREATININE 1.06 (H) 03/07/2025    GLUCOSE 123 (H) 03/07/2025    ALKPHOS 63 03/07/2025    K 4.5 03/07/2025    PROT 7.3 03/07/2025     03/07/2025    AST 10 03/07/2025    ALT 9 03/07/2025    BUN 9 03/07/2025    ANIONGAP 10 03/07/2025    ALBUMIN 3.8 03/07/2025    LIPASE 15 11/21/2017    GFRF 72 08/17/2023     Lab Results   Component Value Date    TRIG 110 03/07/2025    CHOL 158 03/07/2025    LDLCALC 108 (H) 03/07/2025    HDL 28 (L) 03/07/2025     Lab Results   Component Value Date    HGBA1C 10.8 (H) 03/07/2025    HGBA1C 8.8 (H) 09/18/2024    HGBA1C 9.5 (H) 05/28/2024     No components found for: \"UACR\"  The 10-year ASCVD risk score (Olman RODRIGUEZ, et al., 2019) is: 11.3%    Values used to calculate the score:      Age: 50 " years      Sex: Female      Is Non- : Yes      Diabetic: Yes      Tobacco smoker: No      Systolic Blood Pressure: 116 mmHg      Is BP treated: Yes      HDL Cholesterol: 28 mg/dL      Total Cholesterol: 158 mg/dL    Health Maintenance:   Foot Exam: checks daily  Eye Exam: yearly   Lipid Panel:  on 3/7/2025  Urine Albumin: elevated 3/7/2025 - recommend ACE/ARB + SGLT2i  Influenza Immunization: yearly  Pneumonia Immunization: Prevnar 20 recommended  Renal function: GFR 64 on 3/7/2025    Drug Interactions:  None requiring intervention     Assessment/Plan   Problem List Items Addressed This Visit    None  Visit Diagnoses         Type 2 diabetes mellitus with other specified complication, unspecified whether long term insulin use (Multi)        Relevant Medications    semaglutide (Ozempic) 1 mg/dose (4 mg/3 mL) pen injector    Other Relevant Orders    Referral to Clinical Pharmacy          Blood sugars show a lot of improvement since restarting Ozempic. Will increase dose and decrease glipizide to help to simplify regimen. Patient benefits from restarting Ozempic for cardio, renal, and glycemic control. Will titrate medications as tolerated to glycemic control. Can consider a dose increase of metformin if needed down the line.     Patient's UACR was elevated. Recommend starting a low dose ACEi or ARB as blood pressure allows and also an SGLT2i once A1c comes down more to avoid side effects.     Patients diabetes is poorly controlled with most recent A1c of 10.6% on 3/7/2025(Goal < 7%).   Increase:  Ozempic 0.5 mg to 1 mg weekly  Decrease:   Glipizide 5 mg BID to daily  Continue:   Metformin 500 mg BID   Compliance at present is estimated to be good. Efforts to improve compliance (if necessary) will be directed at dietary modifications:  .  Education Provided to Patient:   Ozempic Education:  - Counseled patient on Ozempic MOA, expectations, administration, and monitoring parameters.   -  Counseled patient that Ozempic may cause some GI adverse effects (upset stomach/diarrhea/constipation/nausea/vomiting) when starting out and ways to mitigate include eating smaller meals and limiting greasy or deep fried foods as this increases risk of nausea.   - Advised patient that they may experience improved satiety after meals and portion sizes of meals may be reduced as doses of Ozempic increase.   - Benefits of GLP1-ra in addition to glycemic control include cardioprotection, renal protection and weight loss.   - Reviewed Ozempic titration schedule, starting with 0.25 mg once weekly to a goal of 2 mg once weekly if tolerated.      Labs ordered: UTD  Follow-up: I recommend diabetes care be 1 month.  PCP Follow-Up: 9/8/2025    Juanita Sprague, PharmD  Clinical Pharmacy Specialist    Continue all meds under the continuation of care with the referring provider and clinical pharmacy team.           [1]   Family History  Problem Relation Name Age of Onset    Diabetes Father Father     Cancer Father Father     Hypertension Father Father     Other (cardiac disorder) Brother      Brain cancer Mother's Brother      Breast cancer Maternal Grandmother Jacquelyn [place]     Stroke Maternal Grandmother Jacquelyn [place]     Rashes / Skin problems Maternal Grandmother Jacquelyn Jarred     Diabetes Maternal Grandfather Grandfather     Colon cancer Maternal Great-Grandfather      Heart failure Other grandmother     Cancer Other grandfather     Cancer Other uncle     Depression Mother Vazquez Randi

## 2025-07-15 PROCEDURE — RXMED WILLOW AMBULATORY MEDICATION CHARGE

## 2025-07-16 ENCOUNTER — PHARMACY VISIT (OUTPATIENT)
Dept: PHARMACY | Facility: CLINIC | Age: 50
End: 2025-07-16
Payer: COMMERCIAL

## 2025-07-23 ENCOUNTER — APPOINTMENT (OUTPATIENT)
Dept: PRIMARY CARE | Facility: CLINIC | Age: 50
End: 2025-07-23
Payer: COMMERCIAL

## 2025-08-07 PROCEDURE — RXMED WILLOW AMBULATORY MEDICATION CHARGE

## 2025-08-11 ENCOUNTER — PHARMACY VISIT (OUTPATIENT)
Dept: PHARMACY | Facility: CLINIC | Age: 50
End: 2025-08-11
Payer: COMMERCIAL

## 2025-08-14 ENCOUNTER — APPOINTMENT (OUTPATIENT)
Dept: PHARMACY | Facility: HOSPITAL | Age: 50
End: 2025-08-14
Payer: COMMERCIAL

## 2025-08-14 DIAGNOSIS — E11.69 TYPE 2 DIABETES MELLITUS WITH OTHER SPECIFIED COMPLICATION, UNSPECIFIED WHETHER LONG TERM INSULIN USE (MULTI): ICD-10-CM

## 2025-08-14 RX ORDER — SEMAGLUTIDE 1.34 MG/ML
1 INJECTION, SOLUTION SUBCUTANEOUS
Qty: 3 ML | Refills: 3 | Status: SHIPPED | OUTPATIENT
Start: 2025-08-14

## 2025-08-18 ENCOUNTER — TELEMEDICINE (OUTPATIENT)
Dept: ENDOCRINOLOGY | Facility: CLINIC | Age: 50
End: 2025-08-18
Payer: COMMERCIAL

## 2025-08-18 VITALS — WEIGHT: 161 LBS | BODY MASS INDEX: 32.46 KG/M2 | HEIGHT: 59 IN

## 2025-08-18 DIAGNOSIS — N93.9 ABNORMAL UTERINE BLEEDING (AUB): Primary | ICD-10-CM

## 2025-08-18 PROCEDURE — 99213 OFFICE O/P EST LOW 20 MIN: CPT | Performed by: STUDENT IN AN ORGANIZED HEALTH CARE EDUCATION/TRAINING PROGRAM

## 2025-08-18 PROCEDURE — 3008F BODY MASS INDEX DOCD: CPT | Performed by: STUDENT IN AN ORGANIZED HEALTH CARE EDUCATION/TRAINING PROGRAM

## 2025-08-18 PROCEDURE — 1036F TOBACCO NON-USER: CPT | Performed by: STUDENT IN AN ORGANIZED HEALTH CARE EDUCATION/TRAINING PROGRAM

## 2025-08-18 ASSESSMENT — PAIN SCALES - GENERAL: PAINLEVEL_OUTOF10: 0-NO PAIN

## 2025-08-27 ENCOUNTER — TELEMEDICINE (OUTPATIENT)
Dept: PRIMARY CARE | Facility: CLINIC | Age: 50
End: 2025-08-27
Payer: COMMERCIAL

## 2025-08-27 DIAGNOSIS — M79.89 ARM SWELLING: ICD-10-CM

## 2025-08-27 DIAGNOSIS — D64.9 ANEMIA, UNSPECIFIED TYPE: ICD-10-CM

## 2025-08-27 DIAGNOSIS — R53.83 FATIGUE, UNSPECIFIED TYPE: Primary | ICD-10-CM

## 2025-08-27 DIAGNOSIS — M79.601 RIGHT ARM PAIN: ICD-10-CM

## 2025-08-27 PROCEDURE — 99213 OFFICE O/P EST LOW 20 MIN: CPT | Performed by: INTERNAL MEDICINE

## 2025-08-27 ASSESSMENT — ENCOUNTER SYMPTOMS
SWEATS: 1
HEADACHES: 1

## 2025-08-30 DIAGNOSIS — F43.29 STRESS AND ADJUSTMENT REACTION: ICD-10-CM

## 2025-08-30 DIAGNOSIS — G47.00 INSOMNIA, UNSPECIFIED TYPE: ICD-10-CM

## 2025-08-30 DIAGNOSIS — L29.9 ITCH OF SKIN: ICD-10-CM

## 2025-08-30 DIAGNOSIS — E03.9 HYPOTHYROIDISM, UNSPECIFIED: ICD-10-CM

## 2025-09-02 RX ORDER — LEVOCETIRIZINE DIHYDROCHLORIDE 5 MG/1
5 TABLET, FILM COATED ORAL EVERY EVENING
Qty: 90 TABLET | Refills: 3 | Status: SHIPPED | OUTPATIENT
Start: 2025-09-02

## 2025-09-02 RX ORDER — TRAZODONE HYDROCHLORIDE 50 MG/1
50 TABLET ORAL NIGHTLY PRN
Qty: 90 TABLET | Refills: 0 | Status: SHIPPED | OUTPATIENT
Start: 2025-09-02 | End: 2026-09-02

## 2025-09-02 RX ORDER — LEVOTHYROXINE SODIUM 175 UG/1
175 TABLET ORAL DAILY
Qty: 90 TABLET | Refills: 1 | Status: SHIPPED | OUTPATIENT
Start: 2025-09-02

## 2025-09-03 PROCEDURE — RXMED WILLOW AMBULATORY MEDICATION CHARGE

## 2025-09-04 ENCOUNTER — PHARMACY VISIT (OUTPATIENT)
Dept: PHARMACY | Facility: CLINIC | Age: 50
End: 2025-09-04
Payer: COMMERCIAL

## 2025-09-08 ENCOUNTER — APPOINTMENT (OUTPATIENT)
Dept: PRIMARY CARE | Facility: CLINIC | Age: 50
End: 2025-09-08
Payer: COMMERCIAL

## 2025-09-10 ENCOUNTER — APPOINTMENT (OUTPATIENT)
Dept: PRIMARY CARE | Facility: CLINIC | Age: 50
End: 2025-09-10
Payer: COMMERCIAL

## 2025-09-11 ENCOUNTER — APPOINTMENT (OUTPATIENT)
Dept: PHARMACY | Facility: HOSPITAL | Age: 50
End: 2025-09-11
Payer: COMMERCIAL